# Patient Record
Sex: FEMALE | Race: WHITE | URBAN - METROPOLITAN AREA
[De-identification: names, ages, dates, MRNs, and addresses within clinical notes are randomized per-mention and may not be internally consistent; named-entity substitution may affect disease eponyms.]

---

## 2017-12-05 RX ORDER — LORATADINE 10 MG/1
10 TABLET, ORALLY DISINTEGRATING ORAL DAILY
COMMUNITY

## 2017-12-05 RX ORDER — RALOXIFENE HYDROCHLORIDE 60 MG/1
60 TABLET, FILM COATED ORAL DAILY
COMMUNITY

## 2017-12-11 NOTE — PHARMACY-ADMISSION MEDICATION HISTORY
Admission medication history interview status for this patient is complete. See [facility] admission navigator for allergy information, prior to admission medications and immunization status.     PTA meds completed by pre-admitting nurse Ilda Marcos and reviewed by pharmacy       Prior to Admission medications    Medication Sig Last Dose Taking? Auth Provider   Acetaminophen (TYLENOL EX ST ARTHRITIS PAIN PO) Take 2 tablets by mouth daily  Yes Reported, Patient   diphenhydrAMINE-acetaminophen (TYLENOL PM)  MG tablet Take 1 tablet by mouth nightly as needed for sleep  Yes Reported, Patient   loratadine (CLARITIN REDITABS) 10 MG ODT tab Take 10 mg by mouth daily  Yes Reported, Patient   METOPROLOL SUCCINATE ER PO Take 25 mg by mouth daily  Yes Reported, Patient   psyllium 0.52 G capsule Take 1 capsule by mouth daily  Yes Reported, Patient   DULOXETINE HCL PO Take 30 mg by mouth daily  Yes Reported, Patient   raloxifene (EVISTA) 60 MG tablet Take 60 mg by mouth daily  Yes Reported, Patient   ASPIRIN EC PO Take 81 mg by mouth daily  Yes Reported, Patient   LOSARTAN POTASSIUM PO Take 25 mg by mouth 2 times daily  Yes Reported, Patient   LEVOTHYROXINE SODIUM PO Take 75 mcg by mouth daily   Yes Reported, Patient   OMEPRAZOLE PO Take 40 mg by mouth every morning   Yes Reported, Patient   Ferrous Sulfate (IRON SUPPLEMENT PO) Take 65 mg by mouth 2 times daily (with meals)   Yes Reported, Patient   calcium carbonate (OS-JEIMY 500 MG [place]. [place]) 500 MG tablet Take 500 mg by mouth 3 times daily   Yes Reported, Patient   Alum Hydroxide-Mag Carbonate (GAVISCON PO) Take 2 tablets by mouth 4 times daily   Yes Reported, Patient   Calcium-Vitamin D-Vitamin K 500-100-40 MG-UNT-MCG CHEW Take 3 tablets by mouth daily  Yes Reported, Patient

## 2017-12-13 ENCOUNTER — ANESTHESIA (OUTPATIENT)
Dept: SURGERY | Facility: CLINIC | Age: 82
DRG: 454 | End: 2017-12-13
Payer: MEDICARE

## 2017-12-13 ENCOUNTER — HOSPITAL ENCOUNTER (INPATIENT)
Facility: CLINIC | Age: 82
LOS: 2 days | Discharge: HOME | DRG: 454 | End: 2017-12-15
Attending: NEUROLOGICAL SURGERY | Admitting: NEUROLOGICAL SURGERY
Payer: MEDICARE

## 2017-12-13 ENCOUNTER — ANESTHESIA EVENT (OUTPATIENT)
Dept: SURGERY | Facility: CLINIC | Age: 82
DRG: 454 | End: 2017-12-13
Payer: MEDICARE

## 2017-12-13 ENCOUNTER — APPOINTMENT (OUTPATIENT)
Dept: GENERAL RADIOLOGY | Facility: CLINIC | Age: 82
DRG: 454 | End: 2017-12-13
Attending: NEUROLOGICAL SURGERY
Payer: MEDICARE

## 2017-12-13 DIAGNOSIS — M43.16 SPONDYLOLISTHESIS OF LUMBAR REGION: Primary | ICD-10-CM

## 2017-12-13 PROBLEM — M43.10 SPONDYLOLISTHESIS: Status: ACTIVE | Noted: 2017-12-13

## 2017-12-13 LAB
ABO + RH BLD: NORMAL
ABO + RH BLD: NORMAL
BLD GP AB SCN SERPL QL: NORMAL
BLOOD BANK CMNT PATIENT-IMP: NORMAL
CREAT SERPL-MCNC: 0.81 MG/DL (ref 0.52–1.04)
GFR SERPL CREATININE-BSD FRML MDRD: 67 ML/MIN/1.7M2
POTASSIUM SERPL-SCNC: 3.6 MMOL/L (ref 3.4–5.3)
SPECIMEN EXP DATE BLD: NORMAL

## 2017-12-13 PROCEDURE — 37000009 ZZH ANESTHESIA TECHNICAL FEE, EACH ADDTL 15 MIN: Performed by: NEUROLOGICAL SURGERY

## 2017-12-13 PROCEDURE — 3E0R3BZ INTRODUCTION OF ANESTHETIC AGENT INTO SPINAL CANAL, PERCUTANEOUS APPROACH: ICD-10-PCS | Performed by: NEUROLOGICAL SURGERY

## 2017-12-13 PROCEDURE — 25000125 ZZHC RX 250: Performed by: NEUROLOGICAL SURGERY

## 2017-12-13 PROCEDURE — 82565 ASSAY OF CREATININE: CPT | Performed by: ANESTHESIOLOGY

## 2017-12-13 PROCEDURE — C1713 ANCHOR/SCREW BN/BN,TIS/BN: HCPCS | Performed by: NEUROLOGICAL SURGERY

## 2017-12-13 PROCEDURE — 71000012 ZZH RECOVERY PHASE 1 LEVEL 1 FIRST HR: Performed by: NEUROLOGICAL SURGERY

## 2017-12-13 PROCEDURE — 0SG00A0 FUSION OF LUMBAR VERTEBRAL JOINT WITH INTERBODY FUSION DEVICE, ANTERIOR APPROACH, ANTERIOR COLUMN, OPEN APPROACH: ICD-10-PCS | Performed by: NEUROLOGICAL SURGERY

## 2017-12-13 PROCEDURE — 25000128 H RX IP 250 OP 636: Performed by: ANESTHESIOLOGY

## 2017-12-13 PROCEDURE — 36000069 ZZH SURGERY LEVEL 5 EA 15 ADDTL MIN: Performed by: NEUROLOGICAL SURGERY

## 2017-12-13 PROCEDURE — 86850 RBC ANTIBODY SCREEN: CPT | Performed by: ANESTHESIOLOGY

## 2017-12-13 PROCEDURE — 25000128 H RX IP 250 OP 636: Performed by: NURSE ANESTHETIST, CERTIFIED REGISTERED

## 2017-12-13 PROCEDURE — 71000013 ZZH RECOVERY PHASE 1 LEVEL 1 EA ADDTL HR: Performed by: NEUROLOGICAL SURGERY

## 2017-12-13 PROCEDURE — 12000007 ZZH R&B INTERMEDIATE

## 2017-12-13 PROCEDURE — 84132 ASSAY OF SERUM POTASSIUM: CPT | Performed by: ANESTHESIOLOGY

## 2017-12-13 PROCEDURE — 0SG03J1 FUSION OF LUMBAR VERTEBRAL JOINT WITH SYNTHETIC SUBSTITUTE, POSTERIOR APPROACH, POSTERIOR COLUMN, PERCUTANEOUS APPROACH: ICD-10-PCS | Performed by: NEUROLOGICAL SURGERY

## 2017-12-13 PROCEDURE — 25000128 H RX IP 250 OP 636: Performed by: NEUROLOGICAL SURGERY

## 2017-12-13 PROCEDURE — 40000306 ZZH STATISTIC PRE PROC ASSESS II: Performed by: NEUROLOGICAL SURGERY

## 2017-12-13 PROCEDURE — 25000566 ZZH SEVOFLURANE, EA 15 MIN: Performed by: NEUROLOGICAL SURGERY

## 2017-12-13 PROCEDURE — 3E0R33Z INTRODUCTION OF ANTI-INFLAMMATORY INTO SPINAL CANAL, PERCUTANEOUS APPROACH: ICD-10-PCS | Performed by: NEUROLOGICAL SURGERY

## 2017-12-13 PROCEDURE — 25000125 ZZHC RX 250: Performed by: NURSE ANESTHETIST, CERTIFIED REGISTERED

## 2017-12-13 PROCEDURE — 86901 BLOOD TYPING SEROLOGIC RH(D): CPT | Performed by: ANESTHESIOLOGY

## 2017-12-13 PROCEDURE — 27210794 ZZH OR GENERAL SUPPLY STERILE: Performed by: NEUROLOGICAL SURGERY

## 2017-12-13 PROCEDURE — 25000132 ZZH RX MED GY IP 250 OP 250 PS 637: Mod: GY | Performed by: NEUROLOGICAL SURGERY

## 2017-12-13 PROCEDURE — 0SB20ZZ EXCISION OF LUMBAR VERTEBRAL DISC, OPEN APPROACH: ICD-10-PCS | Performed by: NEUROLOGICAL SURGERY

## 2017-12-13 PROCEDURE — 27211024 ZZHC OR SUPPLY OTHER OPNP: Performed by: NEUROLOGICAL SURGERY

## 2017-12-13 PROCEDURE — 86900 BLOOD TYPING SEROLOGIC ABO: CPT | Performed by: ANESTHESIOLOGY

## 2017-12-13 PROCEDURE — 40000277 XR SURGERY CARM FLUORO LESS THAN 5 MIN W STILLS: Mod: TC

## 2017-12-13 PROCEDURE — 36000071 ZZH SURGERY LEVEL 5 W FLUORO 1ST 30 MIN: Performed by: NEUROLOGICAL SURGERY

## 2017-12-13 PROCEDURE — 37000008 ZZH ANESTHESIA TECHNICAL FEE, 1ST 30 MIN: Performed by: NEUROLOGICAL SURGERY

## 2017-12-13 PROCEDURE — 36415 COLL VENOUS BLD VENIPUNCTURE: CPT | Performed by: ANESTHESIOLOGY

## 2017-12-13 PROCEDURE — A9270 NON-COVERED ITEM OR SERVICE: HCPCS | Mod: GY | Performed by: NEUROLOGICAL SURGERY

## 2017-12-13 DEVICE — IMPLANTABLE DEVICE: Type: IMPLANTABLE DEVICE | Site: SPINE LUMBAR | Status: FUNCTIONAL

## 2017-12-13 RX ORDER — PROPOFOL 10 MG/ML
INJECTION, EMULSION INTRAVENOUS PRN
Status: DISCONTINUED | OUTPATIENT
Start: 2017-12-13 | End: 2017-12-13

## 2017-12-13 RX ORDER — SODIUM CHLORIDE AND POTASSIUM CHLORIDE 150; 450 MG/100ML; MG/100ML
INJECTION, SOLUTION INTRAVENOUS CONTINUOUS
Status: DISCONTINUED | OUTPATIENT
Start: 2017-12-13 | End: 2017-12-15 | Stop reason: HOSPADM

## 2017-12-13 RX ORDER — LIDOCAINE 40 MG/G
CREAM TOPICAL
Status: DISCONTINUED | OUTPATIENT
Start: 2017-12-13 | End: 2017-12-15 | Stop reason: HOSPADM

## 2017-12-13 RX ORDER — OXYCODONE HYDROCHLORIDE 5 MG/1
5-10 TABLET ORAL EVERY 4 HOURS PRN
Status: DISCONTINUED | OUTPATIENT
Start: 2017-12-13 | End: 2017-12-14

## 2017-12-13 RX ORDER — RALOXIFENE HYDROCHLORIDE 60 MG/1
60 TABLET, FILM COATED ORAL DAILY
Status: DISCONTINUED | OUTPATIENT
Start: 2017-12-13 | End: 2017-12-15 | Stop reason: HOSPADM

## 2017-12-13 RX ORDER — DEXAMETHASONE SODIUM PHOSPHATE 4 MG/ML
INJECTION, SOLUTION INTRA-ARTICULAR; INTRALESIONAL; INTRAMUSCULAR; INTRAVENOUS; SOFT TISSUE PRN
Status: DISCONTINUED | OUTPATIENT
Start: 2017-12-13 | End: 2017-12-13

## 2017-12-13 RX ORDER — LOSARTAN POTASSIUM 25 MG/1
25 TABLET ORAL 2 TIMES DAILY
Status: DISCONTINUED | OUTPATIENT
Start: 2017-12-13 | End: 2017-12-15 | Stop reason: HOSPADM

## 2017-12-13 RX ORDER — ACETAMINOPHEN 10 MG/ML
1000 INJECTION, SOLUTION INTRAVENOUS ONCE
Status: COMPLETED | OUTPATIENT
Start: 2017-12-13 | End: 2017-12-13

## 2017-12-13 RX ORDER — NALOXONE HYDROCHLORIDE 0.4 MG/ML
.1-.4 INJECTION, SOLUTION INTRAMUSCULAR; INTRAVENOUS; SUBCUTANEOUS
Status: DISCONTINUED | OUTPATIENT
Start: 2017-12-13 | End: 2017-12-15 | Stop reason: HOSPADM

## 2017-12-13 RX ORDER — CEFAZOLIN SODIUM 1 G/3ML
1 INJECTION, POWDER, FOR SOLUTION INTRAMUSCULAR; INTRAVENOUS SEE ADMIN INSTRUCTIONS
Status: DISCONTINUED | OUTPATIENT
Start: 2017-12-13 | End: 2017-12-13 | Stop reason: HOSPADM

## 2017-12-13 RX ORDER — ACETAMINOPHEN 325 MG/1
975 TABLET ORAL EVERY 8 HOURS
Status: DISCONTINUED | OUTPATIENT
Start: 2017-12-14 | End: 2017-12-15 | Stop reason: HOSPADM

## 2017-12-13 RX ORDER — CEFAZOLIN SODIUM 2 G/100ML
2 INJECTION, SOLUTION INTRAVENOUS
Status: COMPLETED | OUTPATIENT
Start: 2017-12-13 | End: 2017-12-13

## 2017-12-13 RX ORDER — ONDANSETRON 4 MG/1
4 TABLET, ORALLY DISINTEGRATING ORAL EVERY 30 MIN PRN
Status: DISCONTINUED | OUTPATIENT
Start: 2017-12-13 | End: 2017-12-13 | Stop reason: HOSPADM

## 2017-12-13 RX ORDER — METOPROLOL SUCCINATE 25 MG/1
25 TABLET, EXTENDED RELEASE ORAL DAILY
Status: DISCONTINUED | OUTPATIENT
Start: 2017-12-14 | End: 2017-12-15 | Stop reason: HOSPADM

## 2017-12-13 RX ORDER — NALOXONE HYDROCHLORIDE 0.4 MG/ML
.1-.4 INJECTION, SOLUTION INTRAMUSCULAR; INTRAVENOUS; SUBCUTANEOUS
Status: ACTIVE | OUTPATIENT
Start: 2017-12-13 | End: 2017-12-14

## 2017-12-13 RX ORDER — FENTANYL CITRATE 50 UG/ML
INJECTION, SOLUTION INTRAMUSCULAR; INTRAVENOUS PRN
Status: DISCONTINUED | OUTPATIENT
Start: 2017-12-13 | End: 2017-12-13

## 2017-12-13 RX ORDER — LABETALOL HYDROCHLORIDE 5 MG/ML
INJECTION, SOLUTION INTRAVENOUS PRN
Status: DISCONTINUED | OUTPATIENT
Start: 2017-12-13 | End: 2017-12-13

## 2017-12-13 RX ORDER — DULOXETIN HYDROCHLORIDE 30 MG/1
30 CAPSULE, DELAYED RELEASE ORAL EVERY EVENING
Status: DISCONTINUED | OUTPATIENT
Start: 2017-12-13 | End: 2017-12-15 | Stop reason: HOSPADM

## 2017-12-13 RX ORDER — BUPIVACAINE HYDROCHLORIDE 7.5 MG/ML
INJECTION, SOLUTION EPIDURAL; RETROBULBAR PRN
Status: DISCONTINUED | OUTPATIENT
Start: 2017-12-13 | End: 2017-12-13 | Stop reason: HOSPADM

## 2017-12-13 RX ORDER — KETOROLAC TROMETHAMINE 15 MG/ML
15 INJECTION, SOLUTION INTRAMUSCULAR; INTRAVENOUS EVERY 6 HOURS
Status: COMPLETED | OUTPATIENT
Start: 2017-12-13 | End: 2017-12-14

## 2017-12-13 RX ORDER — LIDOCAINE 40 MG/G
CREAM TOPICAL
Status: DISCONTINUED | OUTPATIENT
Start: 2017-12-13 | End: 2017-12-13 | Stop reason: HOSPADM

## 2017-12-13 RX ORDER — LIDOCAINE HYDROCHLORIDE 10 MG/ML
INJECTION, SOLUTION INFILTRATION; PERINEURAL PRN
Status: DISCONTINUED | OUTPATIENT
Start: 2017-12-13 | End: 2017-12-13

## 2017-12-13 RX ORDER — ACETAMINOPHEN 325 MG/1
650 TABLET ORAL EVERY 4 HOURS PRN
Status: DISCONTINUED | OUTPATIENT
Start: 2017-12-16 | End: 2017-12-15 | Stop reason: HOSPADM

## 2017-12-13 RX ORDER — LABETALOL HYDROCHLORIDE 5 MG/ML
10 INJECTION, SOLUTION INTRAVENOUS
Status: DISCONTINUED | OUTPATIENT
Start: 2017-12-13 | End: 2017-12-13 | Stop reason: HOSPADM

## 2017-12-13 RX ORDER — FENTANYL CITRATE 50 UG/ML
25-50 INJECTION, SOLUTION INTRAMUSCULAR; INTRAVENOUS
Status: DISCONTINUED | OUTPATIENT
Start: 2017-12-13 | End: 2017-12-13 | Stop reason: HOSPADM

## 2017-12-13 RX ORDER — ONDANSETRON 2 MG/ML
4 INJECTION INTRAMUSCULAR; INTRAVENOUS EVERY 30 MIN PRN
Status: DISCONTINUED | OUTPATIENT
Start: 2017-12-13 | End: 2017-12-13 | Stop reason: HOSPADM

## 2017-12-13 RX ORDER — GLYCOPYRROLATE 0.2 MG/ML
INJECTION, SOLUTION INTRAMUSCULAR; INTRAVENOUS PRN
Status: DISCONTINUED | OUTPATIENT
Start: 2017-12-13 | End: 2017-12-13

## 2017-12-13 RX ORDER — SODIUM CHLORIDE, SODIUM LACTATE, POTASSIUM CHLORIDE, CALCIUM CHLORIDE 600; 310; 30; 20 MG/100ML; MG/100ML; MG/100ML; MG/100ML
INJECTION, SOLUTION INTRAVENOUS CONTINUOUS
Status: DISCONTINUED | OUTPATIENT
Start: 2017-12-13 | End: 2017-12-13 | Stop reason: HOSPADM

## 2017-12-13 RX ORDER — HYDROMORPHONE HYDROCHLORIDE 1 MG/ML
.3-.5 INJECTION, SOLUTION INTRAMUSCULAR; INTRAVENOUS; SUBCUTANEOUS EVERY 5 MIN PRN
Status: DISCONTINUED | OUTPATIENT
Start: 2017-12-13 | End: 2017-12-13 | Stop reason: HOSPADM

## 2017-12-13 RX ORDER — ONDANSETRON 2 MG/ML
INJECTION INTRAMUSCULAR; INTRAVENOUS PRN
Status: DISCONTINUED | OUTPATIENT
Start: 2017-12-13 | End: 2017-12-13

## 2017-12-13 RX ORDER — LEVOTHYROXINE SODIUM 75 UG/1
75 TABLET ORAL DAILY
Status: DISCONTINUED | OUTPATIENT
Start: 2017-12-13 | End: 2017-12-15 | Stop reason: HOSPADM

## 2017-12-13 RX ORDER — HYDRALAZINE HYDROCHLORIDE 20 MG/ML
2.5-5 INJECTION INTRAMUSCULAR; INTRAVENOUS EVERY 10 MIN PRN
Status: DISCONTINUED | OUTPATIENT
Start: 2017-12-13 | End: 2017-12-13 | Stop reason: HOSPADM

## 2017-12-13 RX ORDER — CEFAZOLIN SODIUM 2 G/100ML
2 INJECTION, SOLUTION INTRAVENOUS
Status: DISCONTINUED | OUTPATIENT
Start: 2017-12-13 | End: 2017-12-13

## 2017-12-13 RX ADMIN — LABETALOL HYDROCHLORIDE 10 MG: 5 INJECTION, SOLUTION INTRAVENOUS at 14:12

## 2017-12-13 RX ADMIN — DEXMEDETOMIDINE HYDROCHLORIDE 0.7 MCG/KG/HR: 100 INJECTION, SOLUTION INTRAVENOUS at 13:51

## 2017-12-13 RX ADMIN — KETOROLAC TROMETHAMINE 15 MG: 15 INJECTION, SOLUTION INTRAMUSCULAR; INTRAVENOUS at 17:54

## 2017-12-13 RX ADMIN — FENTANYL CITRATE 50 MCG: 50 INJECTION INTRAMUSCULAR; INTRAVENOUS at 15:17

## 2017-12-13 RX ADMIN — Medication 0.2 MG: at 16:16

## 2017-12-13 RX ADMIN — SODIUM CHLORIDE, POTASSIUM CHLORIDE, SODIUM LACTATE AND CALCIUM CHLORIDE: 600; 310; 30; 20 INJECTION, SOLUTION INTRAVENOUS at 15:13

## 2017-12-13 RX ADMIN — SODIUM CHLORIDE, POTASSIUM CHLORIDE, SODIUM LACTATE AND CALCIUM CHLORIDE: 600; 310; 30; 20 INJECTION, SOLUTION INTRAVENOUS at 14:09

## 2017-12-13 RX ADMIN — ONDANSETRON 4 MG: 2 INJECTION INTRAMUSCULAR; INTRAVENOUS at 14:47

## 2017-12-13 RX ADMIN — LIDOCAINE HYDROCHLORIDE 30 MG: 10 INJECTION, SOLUTION INFILTRATION; PERINEURAL at 13:31

## 2017-12-13 RX ADMIN — SODIUM CHLORIDE AND POTASSIUM CHLORIDE: 4.5; 1.49 INJECTION, SOLUTION INTRAVENOUS at 21:50

## 2017-12-13 RX ADMIN — FENTANYL CITRATE 25 MCG: 50 INJECTION INTRAMUSCULAR; INTRAVENOUS at 15:49

## 2017-12-13 RX ADMIN — GLYCOPYRROLATE 0.2 MG: 0.2 INJECTION, SOLUTION INTRAMUSCULAR; INTRAVENOUS at 13:31

## 2017-12-13 RX ADMIN — RALOXIFENE HYDROCHLORIDE 60 MG: 60 TABLET, FILM COATED ORAL at 21:51

## 2017-12-13 RX ADMIN — LEVOTHYROXINE SODIUM 75 MCG: 75 TABLET ORAL at 21:51

## 2017-12-13 RX ADMIN — FENTANYL CITRATE 25 MCG: 50 INJECTION INTRAMUSCULAR; INTRAVENOUS at 15:10

## 2017-12-13 RX ADMIN — OXYCODONE HYDROCHLORIDE 5 MG: 5 TABLET ORAL at 22:51

## 2017-12-13 RX ADMIN — ROCURONIUM BROMIDE 10 MG: 10 INJECTION INTRAVENOUS at 13:31

## 2017-12-13 RX ADMIN — LOSARTAN POTASSIUM 25 MG: 25 TABLET, FILM COATED ORAL at 21:51

## 2017-12-13 RX ADMIN — SODIUM CHLORIDE, POTASSIUM CHLORIDE, SODIUM LACTATE AND CALCIUM CHLORIDE: 600; 310; 30; 20 INJECTION, SOLUTION INTRAVENOUS at 13:25

## 2017-12-13 RX ADMIN — DULOXETINE HYDROCHLORIDE 30 MG: 30 CAPSULE, DELAYED RELEASE ORAL at 21:51

## 2017-12-13 RX ADMIN — Medication 70 MG: at 13:31

## 2017-12-13 RX ADMIN — DEXAMETHASONE SODIUM PHOSPHATE 6 MG: 4 INJECTION, SOLUTION INTRA-ARTICULAR; INTRALESIONAL; INTRAMUSCULAR; INTRAVENOUS; SOFT TISSUE at 13:31

## 2017-12-13 RX ADMIN — FENTANYL CITRATE 50 MCG: 50 INJECTION, SOLUTION INTRAMUSCULAR; INTRAVENOUS at 13:30

## 2017-12-13 RX ADMIN — Medication 0.3 MG: at 16:04

## 2017-12-13 RX ADMIN — PROPOFOL 120 MG: 10 INJECTION, EMULSION INTRAVENOUS at 13:31

## 2017-12-13 RX ADMIN — LABETALOL HYDROCHLORIDE 10 MG: 5 INJECTION, SOLUTION INTRAVENOUS at 14:22

## 2017-12-13 RX ADMIN — PROPOFOL 50 MG: 10 INJECTION, EMULSION INTRAVENOUS at 14:01

## 2017-12-13 RX ADMIN — PROPOFOL 50 MG: 10 INJECTION, EMULSION INTRAVENOUS at 13:48

## 2017-12-13 RX ADMIN — CEFAZOLIN SODIUM 2 G: 2 INJECTION, SOLUTION INTRAVENOUS at 13:25

## 2017-12-13 RX ADMIN — ACETAMINOPHEN 1000 MG: 10 INJECTION, SOLUTION INTRAVENOUS at 16:33

## 2017-12-13 RX ADMIN — CEFAZOLIN SODIUM 1 G: 1 INJECTION, SOLUTION INTRAVENOUS at 21:50

## 2017-12-13 NOTE — ANESTHESIA CARE TRANSFER NOTE
Patient: Kimmy Shields    Procedure(s):  L4-L5 Minimally Invasive Direct Lateral Interbody Fusion  - Wound Class: I-Clean    Diagnosis: Spondylolithesis  Diagnosis Additional Information: No value filed.    Anesthesia Type:   General, ETT     Note:  Airway :Face Mask  Patient transferred to:PACU  Handoff Report: Identifed the Patient, Identified the Reponsible Provider, Reviewed the pertinent medical history, Discussed the surgical course, Reviewed Intra-OP anesthesia mangement and issues during anesthesia, Set expectations for post-procedure period and Allowed opportunity for questions and acknowledgement of understanding      Vitals: (Last set prior to Anesthesia Care Transfer)    CRNA VITALS  12/13/2017 1411 - 12/13/2017 1448      12/13/2017             Pulse: 73    SpO2: 100 %    Resp Rate (observed): (!)  5                Electronically Signed By: EDWIN Kimball CRNA  December 13, 2017  2:48 PM

## 2017-12-13 NOTE — ANESTHESIA POSTPROCEDURE EVALUATION
Patient: Kimmy Shields    Procedure(s):  L4-L5 Minimally Invasive Direct Lateral Interbody Fusion  - Wound Class: I-Clean    Diagnosis:Spondylolithesis  Diagnosis Additional Information: 1) L4/5 spondylolistehsis  degenerative disc disease.  2) BMI 32  Obesity     Anesthesia Type:  General, ETT    Note:  Anesthesia Post Evaluation    Patient location during evaluation: PACU  Patient participation: Able to fully participate in evaluation  Level of consciousness: awake  Pain management: adequate  Airway patency: patent  Cardiovascular status: acceptable  Respiratory status: acceptable  Hydration status: acceptable  PONV: controlled     Anesthetic complications: None          Last vitals:  Vitals:    12/13/17 1445 12/13/17 1450 12/13/17 1500   BP: 154/64 150/61 161/69   Resp: 16 10 13   Temp:      SpO2: 100% 100% 99%         Electronically Signed By: Jorge A Mcfarlane DO  December 13, 2017  3:13 PM

## 2017-12-13 NOTE — IP AVS SNAPSHOT
MRN:[id number]                      After Visit Summary   12/13/2017    Kimmy Shields    MRN: [id number]           Thank you!     Thank you for choosing [facility] for your care. Our goal is always to provide you with excellent care. Hearing back from our patients is one way we can continue to improve our services. Please take a few minutes to complete the written survey that you may receive in the mail after you visit. If you would like to speak to someone directly about your visit please contact Patient Relations at [phone]. Thank you!          Patient Information     Date Of Birth          8/9/1932        Designated Caregiver       Most Recent Value    Caregiver    Will someone help with your care after discharge? yes    Name of designated caregiver Heidy Hightower (daughter)    Phone number of caregiver [phone]      About your hospital stay     You were admitted on:  December 13, 2017 You last received care in the:  [facility]    You were discharged on:  December 15, 2017        Reason for your hospital stay       Spine surgery                  Who to Call     For medical emergencies, please call 911.  For non-urgent questions about your medical care, please call your primary care provider or clinic, [phone]  For questions related to your surgery, please call your surgery clinic        Attending Provider     Provider Specialty    Sisi Hardy MD Neurosurgery       Primary Care Provider Office Phone # Fax #    Soledad Hensley [phone]      After Care Instructions     Activity       Your activity upon discharge: Ad peewee within following limitations:  No excessive activities   No Bending, Twisting, climbing, Crawling,   No lifting more than 8 lb for 2 weeks, or 15 lb for 2 months or 25 lb for 4 months or 35 lb for 6 months  Brace for riding cars for 4-6 months            Diet       Follow this diet upon discharge: resume prior  "to admission diet            Discharge Instructions       Refer to [facility] spine handbood or online at [email or web address]  Or print it for patient at [email or web address]                  Follow-up Appointments     Follow-up and recommended labs and tests        Follow up in 2 weeks with me or PCP for wound check ( patient's choice) if patients want to go to PCP for wound check, then f/u in my office  With one month                  Further instructions from your care team       You just recently had back/neck surgery. Please, remember that surgery is invasive and thus that some pain is expected. Now that you are in recovery, there are a few important things to remember.     First, you should try to walk as much as possible during recovery. This mild stress stimulates our body's natural healing process and will speed your recovery. If you experience significantly increased pain while performing an act, STOP what you are doing, do not try to work through the pain. If you continue to work through the pain you may delay the healing process.     Second, it's crucial that you know your restrictions. Remember, this acronym \"BLT\". That is you should not Bend, Lift over 8 pounds (and over shoulder height if cervical patient), or Twist. After two weeks you may increase the weight limit up to 16 pounds, but you still may not bend or twist. These will then be reviewed further with you at your one month appointment. Please adhere to these and your recovery should progress normally.     Third, it is important to follow up after your surgery; specifically on two dates. You MUST see a health care provider (doesn't have to be us) to evaluate the incision 10-14 days after surgery. Why? We just want to make sure the surgical site is not infected and take out staples if used. Infection seems to peek around this time and it is very important to see a medical " professional at this time interval. The later appointment you MUST attend is with us ([facility] [place]), this will be your one month post-op visit.     Lastly, please remember for all medication refills, to have your pharmacy send a refill request or call our office at least 48hrs in advance before you run out of the medication.     We hope your recovery is speedy and that you gain the expected result from the surgery. Thank you for allowing us the opportunity to serve you. Call us at [phone] if you have questions or concerns.  Opioid Medication Information    You have been given a prescription for an opioid (narcotic) pain medicine and/or have received a pain medicine. These medicines can make you drowsy or impaired. You must not drive, operate dangerous equipment, or engage in any other dangerous activities while taking these medications. If you drive while taking these medications, you could be arrested for DUI, or driving under the influence. Do not drink any alcohol while you are taking these medications.   Opioid pain medications can cause addiction. If you have a history of chemical dependency of any type, you are at a higher risk of becoming addicted to pain medications.  Only take these prescribed medications to treat your pain when all other options have been tried. Take it for as short a time and as few doses as possible. Store your pain pills in a secure place, as they are frequently stolen and provide a dangerous opportunity for children or visitors in your house to start abusing these powerful medications. We will not replace any lost or stolen medicine.  As soon as your pain is better, you should seek out a drug take back program (see your local police department) to dispose of them.   Over-the-counter medications and prescription drugs can pollute aldrich and be harmful to humans, fish, and other wildlife when disposed of improperly -- do not flush medications down the toilet or  place in the trash.  Properly disposing of medicines is important to prevent abuse or poisoning and protect the environment.     Prescription and over-the-counter medications are collected anonymously from residents for free at [facility] drop-off locations. Visit the [facility]'s Office Prescription Drug Drop-Off page for the list of drop-off sites and information about the program.       [place]  Police Department [phone] Mon-Fri  8am to 4:30pm     [place]  Police Department [phone] 24hrs a day    [place]  Police Department [phone] Mon-Fri  8am to 6:00pm     [place]  Police Department [phone] Mon-Fri  8am to 4:30pm     [place]  Police Department [phone] 24hrs a day      [place]  Police Department [phone] Mon-Fri  8am to 4:30pm   Many prescription pain medications contain Tylenol  (acetaminophen), including Vicodin , Tylenol #3 , Norco , Lortab , and Percocet .  You should not take any extra pills of Tylenol  if you are using these prescription medications or you can get very sick.  Do not ever take more than 3000 mg of acetaminophen in any 24 hour period.  All opioids tend to cause constipation. Drink plenty of water and eat foods that have a lot of fiber, such as fruits, vegetables, prune juice, apple juice and high fiber cereal.  Take a laxative if you don t move your bowels at least every other day. Miralax , Milk of Magnesia, Colace , or Senna  can be used to keep you regular.  You will likely need to continue stool softeners and stimulants while taking opioids.       Pending Results     No orders found from 12/11/2017 to 12/14/2017.            Statement of Approval     Ordered          12/14/17 1001  I have reviewed and agree with all the recommendations and orders detailed in this document.  EFFECTIVE NOW     Approved and electronically signed by:  Sisi Hardy MD             Admission Information     Date & Time Provider  "Department Dept. Phone    2017 Sisi Hardy MD [facility] [phone]      Your Vitals Were     Blood Pressure Temperature Respirations Height Weight Pulse Oximetry    165/70 98.3  F (36.8  C) 16 1.499 m (4' 11\") 73.5 kg (162 lb 1.6 oz) 93%    BMI (Body Mass Index)                   32.74 kg/m2           [employer] Information     [employer] lets you send messages to your doctor, view your test results, renew your prescriptions, schedule appointments and more. To sign up, go to [email or web address] . Click on \"Log in\" on the left side of the screen, which will take you to the Welcome page. Then click on \"Sign up Now\" on the right side of the page.     You will be asked to enter the access code listed below, as well as some personal information. Please follow the directions to create your username and password.     Your access code is: [email or web address]  Expires: 3/15/2018 10:15 AM     Your access code will  in 90 days. If you need help or a new code, please call your [facility] clinic or [phone].        Care EveryWhere ID     This is your Care EveryWhere ID. This could be used by other organizations to access your [facility] medical records  [id number]        Equal Access to Services     HENRI DELANEY AH: Hadannalee Stauffer, waaxda luqadaha, qaybta kaalmada deisy, patricia ceh. So [facility] [phone].    ATENCIÓN: Si habla español, tiene a whitley disposición servicios gratuitos de asistencia lingüística. Dominic al [phone].    We comply with applicable federal civil rights laws and [place] laws. We do not discriminate on the basis of race, color, national origin, age, disability, sex, sexual orientation, or gender identity.               Review of your medicines      START taking        Dose / Directions    cyclobenzaprine 5 MG tablet   Commonly known as:  FLEXERIL        Dose:  5 mg   Take 1 tablet (5 mg) by mouth 3 times daily as needed for muscle " spasms   Quantity:  42 tablet   Refills:  3       order for DME        Equipment being ordered: Walker Wheels () Treatment Diagnosis: spine surgery   Quantity:  1 each   Refills:  0       oxyCODONE IR 5 MG tablet   Commonly known as:  ROXICODONE        Dose:  5-10 mg   Take 1-2 tablets (5-10 mg) by mouth every 4 hours as needed for moderate to severe pain   Quantity:  18 tablet   Refills:  0         CONTINUE these medicines which have NOT CHANGED        Dose / Directions    ASPIRIN EC PO   Notes to Patient:  Home schedule        Dose:  81 mg   Take 81 mg by mouth daily   Refills:  0       calcium carbonate 1250 MG tablet   Commonly known as:  OS-JEIMY 500 mg [place]. [place]   Notes to Patient:  Home schedule        Dose:  500 mg   Take 500 mg by mouth 3 times daily   Refills:  0       Calcium-Vitamin D-Vitamin K 500-100-40 MG-UNT-MCG Chew   Notes to Patient:  Home schedule        Dose:  3 tablet   Take 3 tablets by mouth daily   Refills:  0       diphenhydrAMINE-acetaminophen  MG tablet   Commonly known as:  TYLENOL PM   Notes to Patient:  Home schedule        Dose:  1 tablet   Take 1 tablet by mouth nightly as needed for sleep   Refills:  0       DULOXETINE HCL PO        Dose:  30 mg   Take 30 mg by mouth daily   Refills:  0       GAVISCON PO   Notes to Patient:  Home schedule        Dose:  2 tablet   Take 2 tablets by mouth 4 times daily   Refills:  0       IRON SUPPLEMENT PO   Notes to Patient:  Home schedule        Dose:  65 mg   Take 65 mg by mouth 2 times daily (with meals)   Refills:  0       LEVOTHYROXINE SODIUM PO        Dose:  75 mcg   Take 75 mcg by mouth daily   Refills:  0       loratadine 10 MG ODT tab   Commonly known as:  CLARITIN REDITABS   Notes to Patient:  Home schedule        Dose:  10 mg   Take 10 mg by mouth daily   Refills:  0       LOSARTAN POTASSIUM PO        Dose:  25 mg   Take 25 mg by mouth 2 times daily   Refills:  0       METOPROLOL SUCCINATE ER PO        Dose:  25 mg   Take 25 mg by  mouth daily   Refills:  0       OMEPRAZOLE PO        Dose:  40 mg   Take 40 mg by mouth every morning   Refills:  0       psyllium 0.52 G capsule        Dose:  1 capsule   Take 1 capsule by mouth daily   Refills:  0       raloxifene 60 MG tablet   Commonly known as:  Evista        Dose:  60 mg   Take 60 mg by mouth daily   Refills:  0       TYLENOL EX ST ARTHRITIS PAIN PO   Notes to Patient:  Home schedule        Dose:  2 tablet   Take 2 tablets by mouth daily   Refills:  0            Where to get your medicines      These medications were sent to [place], [place] - [address] [facility]  [address] [facility] [address]     Phone:  [phone]     cyclobenzaprine 5 MG tablet         Some of these will need a paper prescription and others can be bought over the counter. Ask your nurse if you have questions.     Bring a paper prescription for each of these medications     order for DME    oxyCODONE IR 5 MG tablet                Protect others around you: Learn how to safely use, store and throw away your medicines at www.disposemymeds.org.             Medication List: This is a list of all your medications and when to take them. Check marks below indicate your daily home schedule. Keep this list as a reference.      Medications           Morning Afternoon Evening Bedtime As Needed    ASPIRIN EC PO   Take 81 mg by mouth daily   Notes to Patient:  Home schedule                                   calcium carbonate 1250 MG tablet   Commonly known as:  OS-JEIMY 500 mg [place]. [place]   Take 500 mg by mouth 3 times daily   Notes to Patient:  Home schedule                                Calcium-Vitamin D-Vitamin K 500-100-40 MG-UNT-MCG Chew   Take 3 tablets by mouth daily   Notes to Patient:  Home schedule                                cyclobenzaprine 5 MG tablet   Commonly known as:  FLEXERIL   Take 1 tablet (5 mg) by mouth 3 times daily as needed for muscle spasms                                    diphenhydrAMINE-acetaminophen  MG tablet   Commonly known as:  TYLENOL PM   Take 1 tablet by mouth nightly as needed for sleep   Notes to Patient:  Home schedule                                DULOXETINE HCL PO   Take 30 mg by mouth daily   Last time this was given:  30 mg on 12/14/2017  8:01 PM   Next Dose Due:  tonight                                   GAVISCON PO   Take 2 tablets by mouth 4 times daily   Notes to Patient:  Home schedule                                IRON SUPPLEMENT PO   Take 65 mg by mouth 2 times daily (with meals)   Notes to Patient:  Home schedule                                LEVOTHYROXINE SODIUM PO   Take 75 mcg by mouth daily   Last time this was given:  75 mcg on 12/15/2017  9:20 AM   Next Dose Due:  saturday                                   loratadine 10 MG ODT tab   Commonly known as:  CLARITIN REDITABS   Take 10 mg by mouth daily   Notes to Patient:  Home schedule                                LOSARTAN POTASSIUM PO   Take 25 mg by mouth 2 times daily   Last time this was given:  25 mg on 12/15/2017  9:21 AM   Next Dose Due:  This evening                                      METOPROLOL SUCCINATE ER PO   Take 25 mg by mouth daily   Last time this was given:  25 mg on 12/15/2017  9:21 AM                                OMEPRAZOLE PO   Take 40 mg by mouth every morning   Last time this was given:  40 mg on 12/15/2017  9:21 AM   Next Dose Due:  saturday                                   order for DME   Equipment being ordered: [employer] () Treatment Diagnosis: spine surgery                                oxyCODONE IR 5 MG tablet   Commonly known as:  ROXICODONE   Take 1-2 tablets (5-10 mg) by mouth every 4 hours as needed for moderate to severe pain   Last time this was given:  2.5 mg on 12/14/2017  6:38 PM                                   psyllium 0.52 G capsule   Take 1 capsule by mouth daily   Last time this was given:  1.56 g on 12/15/2017  9:21 AM   Next Dose  Due:  saturday                                   raloxifene 60 MG tablet   Commonly known as:  Evista   Take 60 mg by mouth daily   Last time this was given:  60 mg on 12/15/2017  9:21 AM   Next Dose Due:  saturday                                   TYLENOL EX ST ARTHRITIS PAIN PO   Take 2 tablets by mouth daily   Notes to Patient:  Home schedule

## 2017-12-13 NOTE — ANESTHESIA PREPROCEDURE EVALUATION
Anesthesia Evaluation     . Pt has had prior anesthetic.     No history of anesthetic complications          ROS/MED HX    ENT/Pulmonary:       Neurologic:     (+)neuropathy other neuro spinal stenosis    Cardiovascular:     (+) Dyslipidemia, hypertension----. : . . . :. .       METS/Exercise Tolerance:     Hematologic:         Musculoskeletal:         GI/Hepatic:     (+) GERD       Renal/Genitourinary:         Endo:     (+) thyroid problem hypothyroidism, .      Psychiatric:         Infectious Disease:         Malignancy:   (+) Malignancy History of Lymphoma/Leukemia          Other:                     Physical Exam  Normal systems: cardiovascular and pulmonary    Airway   Mallampati: II  TM distance: >3 FB  Neck ROM: full    Dental     Cardiovascular       Pulmonary                     Anesthesia Plan      History & Physical Review  History and physical reviewed and following examination; no interval change.    ASA Status:  3 .    NPO Status:  > 8 hours    Plan for General and ETT with Intravenous and Propofol induction. Maintenance will be Balanced.    PONV prophylaxis:  Ondansetron (or other 5HT-3) and Dexamethasone or Solumedrol       Postoperative Care  Postoperative pain management:  IV analgesics.      Consents  Anesthetic plan, risks, benefits and alternatives discussed with:  Patient.  Use of blood products discussed: Yes.   Use of blood products discussed with Patient.  Consented to blood products.  .                          .

## 2017-12-13 NOTE — PROGRESS NOTES
Dr. Yuan notified of patient's lethergy and pain level. Ofirmev ordered. Will continue to monitor.

## 2017-12-13 NOTE — IP AVS SNAPSHOT
[facility]    [address]    [facility] [address]    Phone:  [phone]    Fax:  [phone]                                       After Visit Summary   12/13/2017    Kimmy Shields    MRN: [id number]           After Visit Summary Signature Page     I have received my discharge instructions, and my questions have been answered. I have discussed any challenges I see with this plan with the nurse or doctor.    ..........................................................................................................................................  Patient/Patient Representative Signature      ..........................................................................................................................................  Patient Representative Print Name and Relationship to Patient    ..................................................               ................................................  Date                                            Time    ..........................................................................................................................................  Reviewed by Signature/Title    ...................................................              ..............................................  Date                                                            Time

## 2017-12-13 NOTE — PLAN OF CARE
Problem: Patient Care Overview  Goal: Plan of Care/Patient Progress Review  PT: Pt not up to floor at scheduled PT time. Will re-schedule for tomorrow AM and make recommendations at that time.

## 2017-12-14 ENCOUNTER — APPOINTMENT (OUTPATIENT)
Dept: PHYSICAL THERAPY | Facility: CLINIC | Age: 82
DRG: 454 | End: 2017-12-14
Attending: NEUROLOGICAL SURGERY
Payer: MEDICARE

## 2017-12-14 ENCOUNTER — APPOINTMENT (OUTPATIENT)
Dept: OCCUPATIONAL THERAPY | Facility: CLINIC | Age: 82
DRG: 454 | End: 2017-12-14
Attending: NEUROLOGICAL SURGERY
Payer: MEDICARE

## 2017-12-14 LAB
ANION GAP SERPL CALCULATED.3IONS-SCNC: 6 MMOL/L (ref 3–14)
BASOPHILS # BLD AUTO: 0 10E9/L (ref 0–0.2)
BASOPHILS NFR BLD AUTO: 0.1 %
BUN SERPL-MCNC: 11 MG/DL (ref 7–30)
CALCIUM SERPL-MCNC: 8.2 MG/DL (ref 8.5–10.1)
CHLORIDE SERPL-SCNC: 107 MMOL/L (ref 94–109)
CO2 SERPL-SCNC: 26 MMOL/L (ref 20–32)
CREAT SERPL-MCNC: 0.69 MG/DL (ref 0.52–1.04)
DIFFERENTIAL METHOD BLD: ABNORMAL
EOSINOPHIL # BLD AUTO: 0 10E9/L (ref 0–0.7)
EOSINOPHIL NFR BLD AUTO: 0 %
ERYTHROCYTE [DISTWIDTH] IN BLOOD BY AUTOMATED COUNT: 13.7 % (ref 10–15)
GFR SERPL CREATININE-BSD FRML MDRD: 81 ML/MIN/1.7M2
GLUCOSE SERPL-MCNC: 140 MG/DL (ref 70–99)
HCT VFR BLD AUTO: 31 % (ref 35–47)
HGB BLD-MCNC: 10.2 G/DL (ref 11.7–15.7)
IMM GRANULOCYTES # BLD: 0.1 10E9/L (ref 0–0.4)
IMM GRANULOCYTES NFR BLD: 0.4 %
LYMPHOCYTES # BLD AUTO: 1.1 10E9/L (ref 0.8–5.3)
LYMPHOCYTES NFR BLD AUTO: 8.2 %
MCH RBC QN AUTO: 33.8 PG (ref 26.5–33)
MCHC RBC AUTO-ENTMCNC: 32.9 G/DL (ref 31.5–36.5)
MCV RBC AUTO: 103 FL (ref 78–100)
MONOCYTES # BLD AUTO: 1.2 10E9/L (ref 0–1.3)
MONOCYTES NFR BLD AUTO: 8.5 %
NEUTROPHILS # BLD AUTO: 11.3 10E9/L (ref 1.6–8.3)
NEUTROPHILS NFR BLD AUTO: 82.8 %
NRBC # BLD AUTO: 0 10*3/UL
NRBC BLD AUTO-RTO: 0 /100
PLATELET # BLD AUTO: 251 10E9/L (ref 150–450)
POTASSIUM SERPL-SCNC: 4.5 MMOL/L (ref 3.4–5.3)
RBC # BLD AUTO: 3.02 10E12/L (ref 3.8–5.2)
SODIUM SERPL-SCNC: 139 MMOL/L (ref 133–144)
WBC # BLD AUTO: 13.6 10E9/L (ref 4–11)

## 2017-12-14 PROCEDURE — 40000193 ZZH STATISTIC PT WARD VISIT: Performed by: PHYSICAL THERAPIST

## 2017-12-14 PROCEDURE — 97530 THERAPEUTIC ACTIVITIES: CPT | Mod: GP | Performed by: PHYSICAL THERAPIST

## 2017-12-14 PROCEDURE — 99222 1ST HOSP IP/OBS MODERATE 55: CPT | Performed by: CLINICAL NURSE SPECIALIST

## 2017-12-14 PROCEDURE — 25000132 ZZH RX MED GY IP 250 OP 250 PS 637: Mod: GY | Performed by: CLINICAL NURSE SPECIALIST

## 2017-12-14 PROCEDURE — 25000132 ZZH RX MED GY IP 250 OP 250 PS 637: Mod: GY | Performed by: PHYSICIAN ASSISTANT

## 2017-12-14 PROCEDURE — A9270 NON-COVERED ITEM OR SERVICE: HCPCS | Mod: GY | Performed by: PHYSICIAN ASSISTANT

## 2017-12-14 PROCEDURE — 80048 BASIC METABOLIC PNL TOTAL CA: CPT | Performed by: NEUROLOGICAL SURGERY

## 2017-12-14 PROCEDURE — 99222 1ST HOSP IP/OBS MODERATE 55: CPT | Performed by: PHYSICIAN ASSISTANT

## 2017-12-14 PROCEDURE — 25000132 ZZH RX MED GY IP 250 OP 250 PS 637: Mod: GY | Performed by: NEUROLOGICAL SURGERY

## 2017-12-14 PROCEDURE — 40000133 ZZH STATISTIC OT WARD VISIT

## 2017-12-14 PROCEDURE — A9270 NON-COVERED ITEM OR SERVICE: HCPCS | Mod: GY | Performed by: CLINICAL NURSE SPECIALIST

## 2017-12-14 PROCEDURE — 99207 ZZC CONSULT E&M CHANGED TO INITIAL LEVEL: CPT | Performed by: PHYSICIAN ASSISTANT

## 2017-12-14 PROCEDURE — 25000128 H RX IP 250 OP 636: Performed by: NEUROLOGICAL SURGERY

## 2017-12-14 PROCEDURE — 12000007 ZZH R&B INTERMEDIATE

## 2017-12-14 PROCEDURE — 36415 COLL VENOUS BLD VENIPUNCTURE: CPT | Performed by: NEUROLOGICAL SURGERY

## 2017-12-14 PROCEDURE — 85025 COMPLETE CBC W/AUTO DIFF WBC: CPT | Performed by: NEUROLOGICAL SURGERY

## 2017-12-14 PROCEDURE — 97535 SELF CARE MNGMENT TRAINING: CPT | Mod: GO

## 2017-12-14 PROCEDURE — A9270 NON-COVERED ITEM OR SERVICE: HCPCS | Mod: GY | Performed by: NEUROLOGICAL SURGERY

## 2017-12-14 PROCEDURE — 97116 GAIT TRAINING THERAPY: CPT | Mod: GP | Performed by: PHYSICAL THERAPIST

## 2017-12-14 PROCEDURE — 97165 OT EVAL LOW COMPLEX 30 MIN: CPT | Mod: GO

## 2017-12-14 PROCEDURE — 97161 PT EVAL LOW COMPLEX 20 MIN: CPT | Mod: GP | Performed by: PHYSICAL THERAPIST

## 2017-12-14 RX ORDER — CYCLOBENZAPRINE HCL 5 MG
5 TABLET ORAL 3 TIMES DAILY PRN
Qty: 42 TABLET | Refills: 3 | Status: SHIPPED | OUTPATIENT
Start: 2017-12-14

## 2017-12-14 RX ORDER — SENNOSIDES 8.6 MG
1 TABLET ORAL 2 TIMES DAILY PRN
Status: DISCONTINUED | OUTPATIENT
Start: 2017-12-14 | End: 2017-12-15 | Stop reason: HOSPADM

## 2017-12-14 RX ORDER — OXYCODONE HYDROCHLORIDE 5 MG/1
5-10 TABLET ORAL EVERY 4 HOURS PRN
Qty: 18 TABLET | Refills: 0 | Status: SHIPPED | OUTPATIENT
Start: 2017-12-14

## 2017-12-14 RX ADMIN — LOSARTAN POTASSIUM 25 MG: 25 TABLET, FILM COATED ORAL at 08:43

## 2017-12-14 RX ADMIN — RALOXIFENE HYDROCHLORIDE 60 MG: 60 TABLET, FILM COATED ORAL at 08:44

## 2017-12-14 RX ADMIN — KETOROLAC TROMETHAMINE 15 MG: 15 INJECTION, SOLUTION INTRAMUSCULAR; INTRAVENOUS at 06:26

## 2017-12-14 RX ADMIN — DULOXETINE HYDROCHLORIDE 30 MG: 30 CAPSULE, DELAYED RELEASE ORAL at 20:01

## 2017-12-14 RX ADMIN — LOSARTAN POTASSIUM 25 MG: 25 TABLET, FILM COATED ORAL at 20:01

## 2017-12-14 RX ADMIN — OMEPRAZOLE 40 MG: 20 CAPSULE, DELAYED RELEASE ORAL at 08:43

## 2017-12-14 RX ADMIN — METOPROLOL SUCCINATE 25 MG: 25 TABLET, EXTENDED RELEASE ORAL at 08:43

## 2017-12-14 RX ADMIN — ACETAMINOPHEN 975 MG: 325 TABLET, FILM COATED ORAL at 08:43

## 2017-12-14 RX ADMIN — OXYCODONE HYDROCHLORIDE 5 MG: 5 TABLET ORAL at 00:56

## 2017-12-14 RX ADMIN — ACETAMINOPHEN 975 MG: 325 TABLET, FILM COATED ORAL at 16:31

## 2017-12-14 RX ADMIN — ACETAMINOPHEN 975 MG: 325 TABLET, FILM COATED ORAL at 23:38

## 2017-12-14 RX ADMIN — LEVOTHYROXINE SODIUM 75 MCG: 75 TABLET ORAL at 08:43

## 2017-12-14 RX ADMIN — OXYCODONE HYDROCHLORIDE 2.5 MG: 5 TABLET ORAL at 18:38

## 2017-12-14 RX ADMIN — OXYCODONE HYDROCHLORIDE 5 MG: 5 TABLET ORAL at 08:46

## 2017-12-14 RX ADMIN — KETOROLAC TROMETHAMINE 15 MG: 15 INJECTION, SOLUTION INTRAMUSCULAR; INTRAVENOUS at 12:09

## 2017-12-14 RX ADMIN — ACETAMINOPHEN 975 MG: 325 TABLET, FILM COATED ORAL at 00:23

## 2017-12-14 RX ADMIN — OXYCODONE HYDROCHLORIDE 5 MG: 5 TABLET ORAL at 12:48

## 2017-12-14 RX ADMIN — PSYLLIUM HUSK 0.52 G: 20 CAPSULE ORAL at 08:43

## 2017-12-14 RX ADMIN — CEFAZOLIN SODIUM 1 G: 1 INJECTION, SOLUTION INTRAVENOUS at 06:25

## 2017-12-14 RX ADMIN — SENNOSIDES 1 TABLET: 8.6 TABLET, FILM COATED ORAL at 20:01

## 2017-12-14 RX ADMIN — KETOROLAC TROMETHAMINE 15 MG: 15 INJECTION, SOLUTION INTRAMUSCULAR; INTRAVENOUS at 00:25

## 2017-12-14 ASSESSMENT — ACTIVITIES OF DAILY LIVING (ADL): PREVIOUS_RESPONSIBILITIES: HOUSEKEEPING;LAUNDRY;SHOPPING;MEDICATION MANAGEMENT;FINANCES;DRIVING

## 2017-12-14 NOTE — PLAN OF CARE
Problem: Patient Care Overview  Goal: Plan of Care/Patient Progress Review  Outcome: Improving  Vital signs stable, pt alert and oriented x4, hard of hearing, has bilateral hearing aides, same at bedside.  Lungs clear, on O2 at 3 lpm nasal cannula with capnography, sats 96%, IPI 10 CO2       35.  Pt rated pain at 4/10 , turned repositioned, dressing scant amount of dried drainage, same was marked in pacu.  CMS, baseline neuropathy in feet, anterior /top of feet has reddened blotches, no rash present, pt 's baseline.  Requested oxycodone at hs for pain.  Oriented to room, call light and plan of care.

## 2017-12-14 NOTE — CONSULTS
Hospitalist Consultation      Kimmy Shields MRN# [id number]   YOB: 1932 Age: 85 year old   Date of Admission: 12/13/2017     Requesting Physician: Dr. Hardy  Reason for consult:  Post operative management            Assessment and Plan:   This patient is a 85 year old female with a PMH significant for HTN, hypothyroidism, GERD, osteoporosis, and depression who is POD 1 s/p L4/5 interbody fusion.    1. S/p L4/5 interbody fusion: doing well, cont PT/OT and pain control as per primary    2. HTN: stable, continue PTA Metoprolol and Losartan with parameters     3. Hypothyroidism: continue Levothyroxine    4. GERD: resume Omeprazole     5. Leukocytosis: WBC of 13.6 this AM, likely secondary to receiving IV steroids intraoperatively and acute stress response from operation. No signs of underlying infection at this time.     6. Osteoporosis: continue PTA Raloxifene     7. Depression: continue PTA Omeprazole     DVT Prophylaxis: on PCDs as per primary  D/C planning: To home with assistance from daughters              History of Present Illness:   This patient is a 85 year old female who is POD 1 s/p L4/5 interbody fusion. Intra-op report reviewed and showed no intra-op complications. I/o's reviewed, currently net +2.7 L with good UOP since OR. Hgb stable this am at 10.2.     Overnight did pretty well, no complaints, VSS. Currently, today paco diet, pain controlled when laying and increases with movement as well sitting in the chair, denies chest pain, N/V, abdominal pain, or urinary symptoms. She reports some shortness of breath but this is her baseline. She has not passed flatus. O/w other medical problems have been stable, with no recent c/o illness.                Past Medical History:     Past Medical History:   Diagnosis Date     Anemia      Celiac disease      GERD (gastroesophageal reflux disease)      Hiatal hernia      Hypertension     NO cardioligist     Hypothyroidism      Neuropathy      Seasonal  allergies      Skin cancer           Past Surgical History:     Past Surgical History:   Procedure Laterality Date     ABDOMEN SURGERY  4-    hiatal hernia repair     HERNIA REPAIR  04-    hiatal hernia     LAPAROSCOPIC HERNIORRHAPHY GIANT PARAESOPHAGEAL N/A 4/18/2016    Procedure: LAPAROSCOPIC HERNIORRHAPHY GIANT PARAESOPHAGEAL;  Surgeon: Arden Mcmullen MD;  Location:  OR     MOHS MICROGRAPHIC PROCEDURE       TONSILLECTOMY            Social History:     Social History   Substance Use Topics     Smoking status: Never Smoker     Smokeless tobacco: Never Used     Alcohol use No          Family History:   Family history fully reviewed with patient and noncontributory.           Allergies:     Allergies   Allergen Reactions     Gluten Meal GI Disturbance     Sulfa Drugs Unknown     Adhesive Tape Rash          Medications:     Prior to Admission medications    Medication Sig Last Dose Taking? Auth Provider   Acetaminophen (TYLENOL EX ST ARTHRITIS PAIN PO) Take 2 tablets by mouth daily 12/13/2017 at 0700 Yes Reported, Patient   diphenhydrAMINE-acetaminophen (TYLENOL PM)  MG tablet Take 1 tablet by mouth nightly as needed for sleep 12/12/2017 at 2100 Yes Reported, Patient   loratadine (CLARITIN REDITABS) 10 MG ODT tab Take 10 mg by mouth daily 12/11/2017 at 0900 Yes Reported, Patient   METOPROLOL SUCCINATE ER PO Take 25 mg by mouth daily 12/13/2017 at 0700 Yes Reported, Patient   psyllium 0.52 G capsule Take 1 capsule by mouth daily 12/12/2017 at 1400 Yes Reported, Patient   DULOXETINE HCL PO Take 30 mg by mouth daily 12/12/2017 at 2100 Yes Reported, Patient   raloxifene (EVISTA) 60 MG tablet Take 60 mg by mouth daily 12/12/2017 at 0900 Yes Reported, Patient   ASPIRIN EC PO Take 81 mg by mouth daily 12/5/2017 Yes Reported, Patient   LOSARTAN POTASSIUM PO Take 25 mg by mouth 2 times daily 12/12/2017 at 0700 Yes Reported, Patient   LEVOTHYROXINE SODIUM PO Take 75 mcg by mouth daily  12/12/2017 at  "0700 Yes Reported, Patient   OMEPRAZOLE PO Take 40 mg by mouth every morning  12/13/2017 at 0700 Yes Reported, Patient   Ferrous Sulfate (IRON SUPPLEMENT PO) Take 65 mg by mouth 2 times daily (with meals)  12/11/2017 Yes Reported, Patient   calcium carbonate (OS-JEIMY 500 MG [place]. [place]) 500 MG tablet Take 500 mg by mouth 3 times daily  12/12/2017 at 1400 Yes Reported, Patient   Alum Hydroxide-Mag Carbonate (GAVISCON PO) Take 2 tablets by mouth 4 times daily  12/13/2017 at 0700 Yes Reported, Patient   Calcium-Vitamin D-Vitamin K 500-100-40 MG-UNT-MCG CHEW Take 3 tablets by mouth daily   Reported, Patient          Review of Systems:   A comprehensive greater than 10 system review of systems was carried out.  Pertinent positives and negatives are noted above.  Otherwise negative for contributory info.            Physical Exam:   Vitals were reviewed  Blood pressure 147/64, temperature 97  F (36.1  C), resp. rate 16, height 1.499 m (4' 11\"), weight 73.5 kg (162 lb 1.6 oz), SpO2 98 %.  Exam:    GENERAL:  Comfortable.  PSYCH: pleasant, oriented, No acute distress.  HEENT:  PERRLA. Normal conjunctiva, normal hearing, nasal mucosa and ropharynx are normal.  NECK:  Supple, no neck vein distention, adenopathy or bruits, normal thyroid.  HEART:  Normal S1, S2 with no murmur, no pericardial rub, S3 or S4.  LUNGS:  Clear to auscultation, normal respiratory effort.  ABDOMEN:  Soft, no hepatosplenomegaly, normal bowel sounds.  EXTREMITIES:  No pedal edema, +2 radial pulses bilateral and equal.  BACK: Lumbar spine dressing c/d/i  SKIN:  Dry to touch, No rash, wound or ulcerations.  NEUROLOGIC:  Nonfocal with normal cranial nerve and motor power and sensation.          Data:   Past 24 hours labs, studies, and imaging were reviewed.    Results for orders placed or performed during the hospital encounter of 12/13/17   XR Surgery PIPO L/T 5 Min Fluoro w Stills    Narrative    This exam was marked as non-reportable because it will not be " read by a   radiologist or a [facility] non-radiologist provider.             Creatinine   Result Value Ref Range    Creatinine 0.81 0.52 - 1.04 mg/dL    GFR Estimate 67 >60 mL/min/1.7m2    GFR Estimate If Black 81 >60 mL/min/1.7m2   Potassium   Result Value Ref Range    Potassium 3.6 3.4 - 5.3 mmol/L   Basic metabolic panel   Result Value Ref Range    Sodium 139 133 - 144 mmol/L    Potassium 4.5 3.4 - 5.3 mmol/L    Chloride 107 94 - 109 mmol/L    Carbon Dioxide 26 20 - 32 mmol/L    Anion Gap 6 3 - 14 mmol/L    Glucose 140 (H) 70 - 99 mg/dL    Urea Nitrogen 11 7 - 30 mg/dL    Creatinine 0.69 0.52 - 1.04 mg/dL    GFR Estimate 81 >60 mL/min/1.7m2    GFR Estimate If Black >90 >60 mL/min/1.7m2    Calcium 8.2 (L) 8.5 - 10.1 mg/dL   CBC with platelets differential   Result Value Ref Range    WBC 13.6 (H) 4.0 - 11.0 10e9/L    RBC Count 3.02 (L) 3.8 - 5.2 10e12/L    Hemoglobin 10.2 (L) 11.7 - 15.7 g/dL    Hematocrit 31.0 (L) 35.0 - 47.0 %     (H) 78 - 100 fl    MCH 33.8 (H) 26.5 - 33.0 pg    MCHC 32.9 31.5 - 36.5 g/dL    RDW 13.7 10.0 - 15.0 %    Platelet Count 251 150 - 450 10e9/L    Diff Method Automated Method     % Neutrophils 82.8 %    % Lymphocytes 8.2 %    % Monocytes 8.5 %    % Eosinophils 0.0 %    % Basophils 0.1 %    % Immature Granulocytes 0.4 %    Nucleated RBCs 0 0 /100    Absolute Neutrophil 11.3 (H) 1.6 - 8.3 10e9/L    Absolute Lymphocytes 1.1 0.8 - 5.3 10e9/L    Absolute Monocytes 1.2 0.0 - 1.3 10e9/L    Absolute Eosinophils 0.0 0.0 - 0.7 10e9/L    Absolute Basophils 0.0 0.0 - 0.2 10e9/L    Abs Immature Granulocytes 0.1 0 - 0.4 10e9/L    Absolute Nucleated RBC 0.0    ABO/Rh type and screen   Result Value Ref Range    ABO O     RH(D) Pos     Antibody Screen Neg     Test Valid Only At [facility]        Specimen Expires 12/16/2017        Hamida Quiñones PA-C    Pt discussed with Dr. Trevino who agrees with the care as discussed above.

## 2017-12-14 NOTE — PLAN OF CARE
Problem: Patient Care Overview  Goal: Plan of Care/Patient Progress Review  PT: PT naaal completed. Pt is status post lumbar fusion.   Discharge Planner PT   Patient plan for discharge: Pt lives in apartment and planning to have dtg stay with her following DC home.   Current status: Limited ambulation this am, in room, needing min A for bed mobility. FWW.   Barriers to return to prior living situation: needing A for bed mobility  Recommendations for discharge: home with dtg A if able to assist with bed mob PRN  Rationale for recommendations: Pt needing A with bed mobility, but able to ambulate and expect improvement quickly. Pt continues to benefit from IP PT to improve mobility skills.        Entered by: Ilda Ko 12/14/2017 10:05 AM         PT: PM session, improving, CGA for bed mobility. Able to increase distance to 75 feet. Pt mildly forgetful and confused at times. At end of session attempting to get back up to stand to go back to bed after stating would like to sit up in chair.

## 2017-12-14 NOTE — CONSULTS
[facility]  Pain Service Consultation   Text Page    Date of Admission:  12/13/2017    Assessment & Plan   Kimmy Shields is a 85 year old female who was admitted on 12/13/2017. I was asked to see the patient for pain management.    1) Acute pain s/p L4-L5 Minimally invasive direct lateral interbody fusion with discectomy  Severe back pain with radiculopathy due to L4/5 spondylolisthesis and degenerative disc disease  Musculoskeletal, neuropathic.    2)  Patient with chronic low back pain, NOT on chronic opioid therapy.  [facility] database review: no prescriptions in the last 12 months for controlled substances.  0 mg Daily Morphine Equivalent  Patient has a naive opioid tolerance.     Patient's opioid use thus far:   Hydromorphone 0.5 mg IV  Oxycodone 10 mg  = 25 mg Daily Morphine Equivalent  Fentanyl 150 mcg OR/PACU  EBL 76 cc  Post op HGB 10.2 gm/dl 12/14.    3)  Opioid induced side-effects:  -Sedation    4) Other/Related:    -GERD  -Restless leg syndrome  -Bilateral peripheral neuropathy  -Osteoporosis  -Celiac disease  -Hiatal hernia repair  -B cell CLL    PLAN:   1) Post-op care: Pt feels overwhelmed with instructions and information related to her post-op care.  Daughter will be present to receive post op education with patient prior to discharge and will assist pt at home with her recovery.   2)Multimodal Medication Therapy  Topical: None.  NSAIDS: Toradol 15 mg IV q 6 hours x 4 doses.  Muscle Relaxants: None.  Adjuvants:Tylenol 975 mg PO q 8 hours.  Antidepresants/anxiolytics: duloxatine 30 mg PO q HS as PTA.  Opioids: Decrease oxycodone to 2.5-5mg PO q 4 hours prn moderate to severe pain.  4)Non-medication interventions  Ice prn.  Pt prefers quiet, calm in room. Declines chaplan or aromatherapy.  5)Constipation Prophylaxis  Resume daily psylium as pta.  Senna 1 tab po bid prn.  6) DC safety  -Opioid Safety information included in After Visit Summary (AVS)  -Opioid taper at discharge  -Discharge  with family memeber or friend managing pain medications  -Recommend short suppy of Opioids only at discharge (3 days)    Time Spent on this Encounter   I spent 25 minutes in assessment of the patient and discussion with the patient and family.  Another 30 minutes in review of chart, documentation and discussion with the health care team.    Ladi PINEDA, CNS  Pain Management and Palliative Care  [facility]  Pgr: [phone]      Reason for Consult   Reason for consult: I was asked by Dr. Hardy to evaluate this patient for pain management.    Primary Care Physician   Primary Care Physician:Soledad Hensley  Pain Specialist: None.    Chief Complaint   Low back pain.    History is obtained from the patient and electronic health record    History of Present Illness   Kimmy Shields is a 85 year old female who presents with acute low back pain. She is POD#1 for L4-5 interbody fusion due to spondolisthesis and stenosis.     CURRENT PAIN:  Her pain is located in the lower lumbar spine and paraspinous muscles  It is described as Aching, Penetrating and Tender  She rates it as ranging between 0/10 and 8/10  The average is 4/10 on a scale of 0-10  Currently it is rated as 4/10  It improves by sitting down, calming and being quiet, reading books, although she is having difficult concentrating during her hospital stay.  It worsens by doing too much.  She has been compliant with the recommendations while in the hospital.      PAIN HISTORY:  The pain is mainly located in the low lumbar spine. Has had bilateral leg weakness and chronic peripheral neuropathy.  It is described as Aching, Burning and Tender  It improves by sitting.  It worsens by running errands, doing too much, driving long distances in the car.  She has been compliant with the recommendations while in the hospital.    Pt has documentation in H and P of SI joint pain, and L shoulder pathology.    PAST PAIN TREATMENT:    Medications:cymbalta,  tylenol arthritis, tylenol PM.  Non-phamacologic modalities: none.  Previous interventions/surgeries:none.           D.I.R.E Score: Patient Selection for Chronic Opioid Analgesia    For each factor, rate the patient's score from 1 - 3 based on the explanations on the right.       Diagnosis             2         1 = Benign chronic condition with minimal objective findings or no definite medical diagnosis.  Examples:  fibromyalgia, migraine, headaches, non-specific back pain.  2 = Slowly progressive condition concordant with moderate pain, or fixed condition with moderate objective findings.  Examples: failed back surgery syndrome, back pain with moderate degenerative changes, neuropathic pain.  3 = Advanced condition concordant with severe pain with objective findings.  Examples: severe ischemic vascular disease, advanced neuropathy, severe spinal stenosis.    Intractability             2         1 = Few therapies have been tried and the patient takes a passive role in his/her pain management process.   2 = Most costomary treatments have been tried but the patient is not fully engaged in the pain management process, or barriers prevent (insurance, transportation, medical illness)  3 = Patient fully engaged in a spectrum of appropriate treatments but with inadequate response.    Risk   (Risk = Total of P+C+R+S below)       Psychological             2         1 = Serious personality dysfunction or mental illness interfering with care.  Examples: personality disorder, severe affective disorder, significant personality issues.  2 = Personality or mental health interferes moderately.  Example: depression or anxiety disorder.  3 = Good communication with the clinic.  No significant personality dysfunction or mental illness.       Chemical      Health             3         1 = Active or very recent use of illicit drugs, excessive alcohol, or prescription drug abuse.  2 = Chemical coper (uses  medications to cope with stress) or history of chemical dependency in remission.  3 = No CD history.  Not drug-focused or chemically reliant       Reliability             2         1 = History of numerous problems: medication misuse, missed appointments, rarely follows through.  2 = Occasional difficulties with compliance, but generally reliable.  3 = Highly reliable patient with medications, appointments and treatment.       Social      Support             2         1= Life in chaos.  Little family support and few close relationships.  Loss of most normal life roles.  2 = Reduction in some relationships and life roles.  3 = Supportive family/close relationships.  Involved in work or school and no social isolation.    Efficacy score             2         1 = Poor function or minimal pain relief despite moderate to high doses.  2 = Moderate benefit with function improved in a number of ways (or insufficient info - hasn't tried opioid yet or very low doses or too short a trial.  3 = Good improvement in pain and function and quality of life with stable doses over time.                                    15    Total score = D + I + R + E    Score 7-13: Not a suitable candidate for long-term opioid analgesia  Score 14-21: May be a good candidate for long-term opioid analgesia    Copyright 2013 Naman Flannery MD, The DIRE Score: Predicting Outcomes of Opioid Prescribing for Chronic Pain. The Journal of Pain. 7(9) (September), 2006:671-681    Past Medical History   I have reviewed this patient's medical history and updated it with pertinent information if needed.   Past Medical History:   Diagnosis Date     Anemia      Celiac disease      GERD (gastroesophageal reflux disease)      Hiatal hernia      Hypertension     NO cardioligist     Hypothyroidism      Neuropathy      Seasonal allergies      Skin cancer    B cell CLL per H and P.    Past Surgical History   I have reviewed this patient's surgical history and updated it  with pertinent information if needed.  Past Surgical History:   Procedure Laterality Date     ABDOMEN SURGERY  4-    hiatal hernia repair     FUSION SPINE POSTERIOR MINIMALLY INVASIVE ONE LEVEL N/A 12/13/2017    Procedure: FUSION SPINE POSTERIOR MINIMALLY INVASIVE ONE LEVEL;  L4/5 Direct Lateral Lumbar Interbody Fusion with discectomy  L4/5  Posterior minimally invasive pedicle screw placement and posterolateral instrumentation and fusion   Epidural steroid injection;  Surgeon: Sisi Hardy MD;  Location: [facility] OR     HERNIA REPAIR  04-    hiatal hernia     INJECT STEROID (LOCATION) N/A 12/13/2017    Procedure: INJECT STEROID (LOCATION);;  Surgeon: Sisi Hardy MD;  Location: [facility] OR     LAPAROSCOPIC HERNIORRHAPHY GIANT PARAESOPHAGEAL N/A 4/18/2016    Procedure: LAPAROSCOPIC HERNIORRHAPHY GIANT PARAESOPHAGEAL;  Surgeon: Arden Mcmullen MD;  Location: [facility] OR     MOHS MICROGRAPHIC PROCEDURE       TONSILLECTOMY           Prior to Admission Medications   Prior to Admission Medications   Prescriptions Last Dose Informant Patient Reported? Taking?   ASPIRIN EC PO 12/5/2017  Yes Yes   Sig: Take 81 mg by mouth daily   Acetaminophen (TYLENOL EX ST ARTHRITIS PAIN PO) 12/13/2017 at 0700  Yes Yes   Sig: Take 2 tablets by mouth daily   Alum Hydroxide-Mag Carbonate (GAVISCON PO) 12/13/2017 at 0700  Yes Yes   Sig: Take 2 tablets by mouth 4 times daily    Calcium-Vitamin D-Vitamin K 500-100-40 MG-UNT-MCG CHEW   Yes No   Sig: Take 3 tablets by mouth daily   DULOXETINE HCL PO 12/12/2017 at 2100  Yes Yes   Sig: Take 30 mg by mouth daily   Ferrous Sulfate (IRON SUPPLEMENT PO) 12/11/2017  Yes Yes   Sig: Take 65 mg by mouth 2 times daily (with meals)    LEVOTHYROXINE SODIUM PO 12/12/2017 at 0700  Yes Yes   Sig: Take 75 mcg by mouth daily    LOSARTAN POTASSIUM PO 12/12/2017 at 0700  Yes Yes   Sig: Take 25 mg by mouth 2 times daily   METOPROLOL SUCCINATE ER PO 12/13/2017 at 0700  Yes Yes   Sig: Take 25 mg by mouth daily    OMEPRAZOLE PO 2017 at 0700  Yes Yes   Sig: Take 40 mg by mouth every morning    calcium carbonate (OS-JEIMY 500 MG [place]. [place]) 500 MG tablet 2017 at 1400  Yes Yes   Sig: Take 500 mg by mouth 3 times daily    diphenhydrAMINE-acetaminophen (TYLENOL PM)  MG tablet 2017 at 2100  Yes Yes   Sig: Take 1 tablet by mouth nightly as needed for sleep   loratadine (CLARITIN REDITABS) 10 MG ODT tab 2017 at 0900  Yes Yes   Sig: Take 10 mg by mouth daily   psyllium 0.52 G capsule 2017 at 1400  Yes Yes   Sig: Take 1 capsule by mouth daily   raloxifene (EVISTA) 60 MG tablet 2017 at 0900  Yes Yes   Sig: Take 60 mg by mouth daily      Facility-Administered Medications: None     Allergies   Allergies   Allergen Reactions     Gluten Meal GI Disturbance     Sulfa Drugs Unknown     Adhesive Tape Rash       Social History   I have reviewed this patient's social history and updated it with pertinent information if needed. Kimmy Shields  reports that she has never smoked. She has never used smokeless tobacco. She reports that she does not drink alcohol or use illicit drugs.     Pt is .   of cancer in . Lives in an apartment by herself. Has 2 adult daughters that will help her after discharge.  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     Family History   I have reviewed this patient's family history and updated it with pertinent information if needed.   History reviewed. No pertinent family history.    Family history of addiction - not disclosed.    Review of Systems   C: NEGATIVE for fever, chills, change in weight  E/M: NEGATIVE for ear, mouth and throat problems  R: NEGATIVE for significant cough or SOB  CV: NEGATIVE for chest pain, palpitations or peripheral edema   Denies Bowel or bladder dysfunction    Physical Exam   Temp:  [96.5  F (35.8  C)-98.5  F (36.9  C)] 98  F (36.7  C)  Heart Rate:  [63-89]  69  Resp:  [6-18] 16  BP: (115-168)/() 115/52  SpO2:  [91 %-100 %] 92 %  162 lbs 1.6 oz  GEN:  Alert, oriented x 3, appears comfortable, NAD.  HEENT:  Normocephalic/atraumatic, no scleral icterus, no nasal discharge, mouth dry.  CV:  RRR, S1, S2; no murmurs or other irregularities noted.  +3 DP/PT pulses bilatererally; no edema BLE.  RESP:  Clear to auscultation bilaterally without rales/rhonchi/wheezing/retractions.  Symmetric chest rise on inhalation noted.  Normal respiratory effort.  ABD:  Rounded, soft, non-tender/non-distended.  +BS  EXT:  Edema & pulses as noted above.  CMS intact x 4.     M/S:   Tender to palpation lower lumbar spine and paraspinous muscles.    SKIN:  Dry to touch, incision cdi.  NEURO: Symmetric strength +5/5.  No change post op to pt's bilateral peripheral neuropathy. There is no area of allodynia or hyperesthesia.  PAIN BEHAVIOR: Cooperative  Psych:  Normal affect. Overwhelmed and confused with all of the information she has been given today from members of the healthcare team. Cooperative, conversant appropriately.       Data   Results for orders placed or performed during the hospital encounter of 12/13/17 (from the past 24 hour(s))   XR Surgery PIPO L/T 5 Min Fluoro w Stills    Narrative    This exam was marked as non-reportable because it will not be read by a   radiologist or a [place] non-radiologist provider.             Hospitalist IP Consult: Patient to be seen: Routine - within 24 hours; Hospitalist Consult; Consultant may enter orders: Yes    Narrative    Flor Quiñones PA-C     12/14/2017 12:22 PM  Hospitalist Consultation      Kimmy Shields MRN# [id number]   YOB: 1932 Age: 85 year old   Date of Admission: 12/13/2017     Requesting Physician: Dr. Hardy  Reason for consult:  Post operative management            Assessment and Plan:   This patient is a 85 year old female with a PMH significant for   HTN, hypothyroidism, GERD, osteoporosis, and  depression who is   POD 1 s/p L4/5 interbody fusion.    1. S/p L4/5 interbody fusion: doing well, cont PT/OT and pain   control as per primary    2. HTN: stable, continue PTA Metoprolol and Losartan with   parameters     3. Hypothyroidism: continue Levothyroxine    4. GERD: resume Omeprazole     5. Leukocytosis: WBC of 13.6 this AM, likely secondary to   receiving IV steroids intraoperatively and acute stress response   from operation. No signs of underlying infection at this time.     6. Osteoporosis: continue PTA Raloxifene     7. Depression: continue PTA Omeprazole     DVT Prophylaxis: on PCDs as per primary  D/C planning: To home with assistance from daughters              History of Present Illness:   This patient is a 85 year old female who is POD 1 s/p L4/5   interbody fusion. Intra-op report reviewed and showed no intra-op   complications. I/o's reviewed, currently net +2.7 L with good UOP   since OR. Hgb stable this am at 10.2.     Overnight did pretty well, no complaints, VSS. Currently, today   paco diet, pain controlled when laying and increases with movement   as well sitting in the chair, denies chest pain, N/V, abdominal   pain, or urinary symptoms. She reports some shortness of breath   but this is her baseline. She has not passed flatus. O/w other   medical problems have been stable, with no recent c/o illness.                Past Medical History:     Past Medical History:   Diagnosis Date     Anemia      Celiac disease      GERD (gastroesophageal reflux disease)      Hiatal hernia      Hypertension     NO cardioligist     Hypothyroidism      Neuropathy      Seasonal allergies      Skin cancer           Past Surgical History:     Past Surgical History:   Procedure Laterality Date     ABDOMEN SURGERY  4-    hiatal hernia repair     HERNIA REPAIR  04-    hiatal hernia     LAPAROSCOPIC HERNIORRHAPHY GIANT PARAESOPHAGEAL N/A 4/18/2016    Procedure: LAPAROSCOPIC HERNIORRHAPHY GIANT  PARAESOPHAGEAL;    Surgeon: Arden Mcmullen MD;  Location: [facility] OR     MOHS MICROGRAPHIC PROCEDURE       TONSILLECTOMY            Social History:     Social History   Substance Use Topics     Smoking status: Never Smoker     Smokeless tobacco: Never Used     Alcohol use No          Family History:   Family history fully reviewed with patient and noncontributory.           Allergies:     Allergies   Allergen Reactions     Gluten Meal GI Disturbance     Sulfa Drugs Unknown     Adhesive Tape Rash          Medications:     Prior to Admission medications    Medication Sig Last Dose Taking? Auth Provider   Acetaminophen (TYLENOL EX ST ARTHRITIS PAIN PO) Take 2 tablets by   mouth daily 12/13/2017 at 0700 Yes Reported, Patient   diphenhydrAMINE-acetaminophen (TYLENOL PM)  MG tablet Take   1 tablet by mouth nightly as needed for sleep 12/12/2017 at 2100   Yes Reported, Patient   loratadine (CLARITIN REDITABS) 10 MG ODT tab Take 10 mg by mouth   daily 12/11/2017 at 0900 Yes Reported, Patient   METOPROLOL SUCCINATE ER PO Take 25 mg by mouth daily 12/13/2017   at 0700 Yes Reported, Patient   psyllium 0.52 G capsule Take 1 capsule by mouth daily 12/12/2017   at 1400 Yes Reported, Patient   DULOXETINE HCL PO Take 30 mg by mouth daily 12/12/2017 at 2100   Yes Reported, Patient   raloxifene (EVISTA) 60 MG tablet Take 60 mg by mouth daily   12/12/2017 at 0900 Yes Reported, Patient   ASPIRIN EC PO Take 81 mg by mouth daily 12/5/2017 Yes Reported,   Patient   LOSARTAN POTASSIUM PO Take 25 mg by mouth 2 times daily   12/12/2017 at 0700 Yes Reported, Patient   LEVOTHYROXINE SODIUM PO Take 75 mcg by mouth daily  12/12/2017 at   0700 Yes Reported, Patient   OMEPRAZOLE PO Take 40 mg by mouth every morning  12/13/2017 at   0700 Yes Reported, Patient   Ferrous Sulfate (IRON SUPPLEMENT PO) Take 65 mg by mouth 2 times   daily (with meals)  12/11/2017 Yes Reported, Patient   calcium carbonate (OS-JEIMY 500 MG [place]. [place]) 500 MG tablet Take  "500   mg by mouth 3 times daily  12/12/2017 at 1400 Yes Reported,   Patient   Alum Hydroxide-Mag Carbonate (GAVISCON PO) Take 2 tablets by   mouth 4 times daily  12/13/2017 at 0700 Yes Reported, Patient   Calcium-Vitamin D-Vitamin K 500-100-40 MG-UNT-MCG CHEW Take 3   tablets by mouth daily   Reported, Patient          Review of Systems:   A comprehensive greater than 10 system review of systems was   carried out.  Pertinent positives and negatives are noted above.    Otherwise negative for contributory info.            Physical Exam:   Vitals were reviewed  Blood pressure 147/64, temperature 97  F (36.1  C), resp. rate   16, height 1.499 m (4' 11\"), weight 73.5 kg (162 lb 1.6 oz), SpO2   98 %.  Exam:    GENERAL:  Comfortable.  PSYCH: pleasant, oriented, No acute distress.  HEENT:  PERRLA. Normal conjunctiva, normal hearing, nasal mucosa   and ropharynx are normal.  NECK:  Supple, no neck vein distention, adenopathy or bruits,   normal thyroid.  HEART:  Normal S1, S2 with no murmur, no pericardial rub, S3 or   S4.  LUNGS:  Clear to auscultation, normal respiratory effort.  ABDOMEN:  Soft, no hepatosplenomegaly, normal bowel sounds.  EXTREMITIES:  No pedal edema, +2 radial pulses bilateral and   equal.  BACK: Lumbar spine dressing c/d/i  SKIN:  Dry to touch, No rash, wound or ulcerations.  NEUROLOGIC:  Nonfocal with normal cranial nerve and motor power   and sensation.          Data:   Past 24 hours labs, studies, and imaging were reviewed.    Results for orders placed or performed during the hospital   encounter of 12/13/17   XR Surgery PIPO L/T 5 Min Fluoro w Stills    Narrative    This exam was marked as non-reportable because it will not be   read by a   radiologist or a [place] non-radiologist provider.             Creatinine   Result Value Ref Range    Creatinine 0.81 0.52 - 1.04 mg/dL    GFR Estimate 67 >60 mL/min/1.7m2    GFR Estimate If Black 81 >60 mL/min/1.7m2   Potassium   Result Value Ref Range    " Potassium 3.6 3.4 - 5.3 mmol/L   Basic metabolic panel   Result Value Ref Range    Sodium 139 133 - 144 mmol/L    Potassium 4.5 3.4 - 5.3 mmol/L    Chloride 107 94 - 109 mmol/L    Carbon Dioxide 26 20 - 32 mmol/L    Anion Gap 6 3 - 14 mmol/L    Glucose 140 (H) 70 - 99 mg/dL    Urea Nitrogen 11 7 - 30 mg/dL    Creatinine 0.69 0.52 - 1.04 mg/dL    GFR Estimate 81 >60 mL/min/1.7m2    GFR Estimate If Black >90 >60 mL/min/1.7m2    Calcium 8.2 (L) 8.5 - 10.1 mg/dL   CBC with platelets differential   Result Value Ref Range    WBC 13.6 (H) 4.0 - 11.0 10e9/L    RBC Count 3.02 (L) 3.8 - 5.2 10e12/L    Hemoglobin 10.2 (L) 11.7 - 15.7 g/dL    Hematocrit 31.0 (L) 35.0 - 47.0 %     (H) 78 - 100 fl    MCH 33.8 (H) 26.5 - 33.0 pg    MCHC 32.9 31.5 - 36.5 g/dL    RDW 13.7 10.0 - 15.0 %    Platelet Count 251 150 - 450 10e9/L    Diff Method Automated Method     % Neutrophils 82.8 %    % Lymphocytes 8.2 %    % Monocytes 8.5 %    % Eosinophils 0.0 %    % Basophils 0.1 %    % Immature Granulocytes 0.4 %    Nucleated RBCs 0 0 /100    Absolute Neutrophil 11.3 (H) 1.6 - 8.3 10e9/L    Absolute Lymphocytes 1.1 0.8 - 5.3 10e9/L    Absolute Monocytes 1.2 0.0 - 1.3 10e9/L    Absolute Eosinophils 0.0 0.0 - 0.7 10e9/L    Absolute Basophils 0.0 0.0 - 0.2 10e9/L    Abs Immature Granulocytes 0.1 0 - 0.4 10e9/L    Absolute Nucleated RBC 0.0    ABO/Rh type and screen   Result Value Ref Range    ABO O     RH(D) Pos     Antibody Screen Neg     Test Valid Only At [facility]        Specimen Expires 12/16/2017        Hamida Quiñones PA-C    Pt discussed with Dr. Trevino who agrees with the care as   discussed above.        Basic metabolic panel   Result Value Ref Range    Sodium 139 133 - 144 mmol/L    Potassium 4.5 3.4 - 5.3 mmol/L    Chloride 107 94 - 109 mmol/L    Carbon Dioxide 26 20 - 32 mmol/L    Anion Gap 6 3 - 14 mmol/L    Glucose 140 (H) 70 - 99 mg/dL    Urea Nitrogen 11 7 - 30 mg/dL    Creatinine 0.69 0.52 - 1.04 mg/dL    GFR  Estimate 81 >60 mL/min/1.7m2    GFR Estimate If Black >90 >60 mL/min/1.7m2    Calcium 8.2 (L) 8.5 - 10.1 mg/dL   CBC with platelets differential   Result Value Ref Range    WBC 13.6 (H) 4.0 - 11.0 10e9/L    RBC Count 3.02 (L) 3.8 - 5.2 10e12/L    Hemoglobin 10.2 (L) 11.7 - 15.7 g/dL    Hematocrit 31.0 (L) 35.0 - 47.0 %     (H) 78 - 100 fl    MCH 33.8 (H) 26.5 - 33.0 pg    MCHC 32.9 31.5 - 36.5 g/dL    RDW 13.7 10.0 - 15.0 %    Platelet Count 251 150 - 450 10e9/L    Diff Method Automated Method     % Neutrophils 82.8 %    % Lymphocytes 8.2 %    % Monocytes 8.5 %    % Eosinophils 0.0 %    % Basophils 0.1 %    % Immature Granulocytes 0.4 %    Nucleated RBCs 0 0 /100    Absolute Neutrophil 11.3 (H) 1.6 - 8.3 10e9/L    Absolute Lymphocytes 1.1 0.8 - 5.3 10e9/L    Absolute Monocytes 1.2 0.0 - 1.3 10e9/L    Absolute Eosinophils 0.0 0.0 - 0.7 10e9/L    Absolute Basophils 0.0 0.0 - 0.2 10e9/L    Abs Immature Granulocytes 0.1 0 - 0.4 10e9/L    Absolute Nucleated RBC 0.0

## 2017-12-14 NOTE — CONSULTS
Care Transitions Team: Following for CC, discharge planning, and disposition.        Per chart review MD has consulted [facility] for potential for disposition concerns.     Per PT  POD 1 assessment     Discharge Planner PT   Patient plan for discharge: Pt lives in apartment and planning to have dtg stay with her following DC home.   Current status: Limited ambulation this am, in room, needing min A for bed mobility. FWW.   Barriers to return to prior living situation: needing A for bed mobility  Recommendations for discharge: home with dtg A if able to assist with bed mob PRN  Rationale for recommendations: Pt needing A with bed mobility, but able to ambulate and expect improvement quickly. Pt continues to benefit from IP PT to improve mobility skills.     Will follow up on POD 2 and validate for continued plan of care or other needs.     Camila Higgins RN   Care Transitions Team  [phone]

## 2017-12-14 NOTE — PROGRESS NOTES
12/14/17 0909   Quick Adds   Type of Visit Initial PT Evaluation   Living Environment   Lives With alone   Living Arrangements apartment   Number of Stairs to Enter Home 0   Number of Stairs Within Home 0   Transportation Available car;family or friend will provide   Living Environment Comment Pt's dtg will be staying with patient   Self-Care   Usual Activity Tolerance good   Current Activity Tolerance fair   Regular Exercise yes   Activity/Exercise Type walking   Exercise Amount/Frequency daily   Equipment Currently Used at Home walker, rolling;shower chair   Functional Level Prior   Ambulation 0-->independent   Transferring 0-->independent   Toileting 0-->independent   Bathing 0-->independent   Dressing 0-->independent   Eating 0-->independent   Communication 0-->understands/communicates without difficulty   Swallowing 0-->swallows foods/liquids without difficulty   Cognition 0 - no cognition issues reported   Fall history within last six months no   Number of times patient has fallen within last six months (2 falls previous)   Prior Functional Level Comment Pt reports doing own cares and driving.   General Information   Onset of Illness/Injury or Date of Surgery - Date 12/13/17   Referring Physician Dr. Mead   Patient/Family Goals Statement Pt hoping to D/C home with daughter tomorrow.   Pertinent History of Current Problem (include personal factors and/or comorbidities that impact the POC) Pt is status post L4/5  Direct lateral lumbar interbody fusion with discectomy and L4/5  Posterior minimally invasive pedicle screw placement and posterolateral instrumentation and fusion.   Precautions/Limitations spinal precautions   General Info Comments Pt agreeable to PT   Cognitive Status Examination   Orientation orientation to person, place and time   Level of Consciousness alert   Follows Commands and Answers Questions 100% of the time;able to follow multistep instructions   Personal Safety and Judgment intact  "  Memory intact   Pain Assessment   Patient Currently in Pain Yes, see Vital Sign flowsheet   Integumentary/Edema   Integumentary/Edema Comments as expected following spinal surgery   Posture    Posture Forward head position;Protracted shoulders   Range of Motion (ROM)   ROM Comment pain with hip flexion, tolerating 90 degree positioning   Strength   Strength Comments demos 3/5 in hips, knees and ankles   Bed Mobility   Bed Mobility Comments Min A   Transfer Skills   Transfer Comments min A, FWW   Gait   Gait Comments CGA/min A x 2, FWW   Balance   Balance Comments no LOB but heavy reliance on FWW   Sensory Examination   Sensory Perception Comments baseline neuropathy to B feet   Modality Interventions   Planned Modality Interventions Cryotherapy   General Therapy Interventions   Planned Therapy Interventions balance training;bed mobility training;gait training;strengthening;transfer training;risk factor education;home program guidelines;progressive activity/exercise   Clinical Impression   Criteria for Skilled Therapeutic Intervention yes, treatment indicated   PT Diagnosis decreased functional mobility status post spine fusion   Influenced by the following impairments pain, decreased strength, spinal precautions   Functional limitations due to impairments decreased transfers, ambulation   Clinical Presentation Stable/Uncomplicated   Clinical Presentation Rationale improving as expected following spinal surgery   Clinical Decision Making (Complexity) Low complexity   Therapy Frequency` 2 times/day   Predicted Duration of Therapy Intervention (days/wks) 3 days   Anticipated Discharge Disposition Home with Assist   Risk & Benefits of therapy have been explained Yes   Patient, Family & other staff in agreement with plan of care Yes   [facility] AM-PAC TM \"6 Clicks\"   2016, Trustees of [facility], under license to [employer].  All rights reserved.   6 Clicks Short Forms Basic Mobility Inpatient Short " "Form   [facility] AM-PAC  \"6 Clicks\" V.2 Basic Mobility Inpatient Short Form   1. Turning from your back to your side while in a flat bed without using bedrails? 3 - A Little   2. Moving from lying on your back to sitting on the side of a flat bed without using bedrails? 2 - A Lot   3. Moving to and from a bed to a chair (including a wheelchair)? 3 - A Little   4. Standing up from a chair using your arms (e.g., wheelchair, or bedside chair)? 3 - A Little   5. To walk in hospital room? 2 - A Lot   6. Climbing 3-5 steps with a railing? 2 - A Lot   Basic Mobility Raw Score (Score out of 24.Lower scores equate to lower levels of function) 15   Total Evaluation Time   Total Evaluation Time (Minutes) 10     "

## 2017-12-14 NOTE — PROGRESS NOTES
DAILY PROGRESS NOTE    Kimmy Shields is a 85 year old old female admitted on 12/13/2017  8:47 AM.    Subjective  Comfortable      Objective    AAOx3, ABREU , f/c 4/4   Ambulating, radiculopathy improved     Hemoglobin   Date Value Ref Range Status   12/14/2017 10.2 (L) 11.7 - 15.7 g/dL Final   ]      Impression / Plan       Plan for today:    Patient doing well  Ambulate with help  PT OT, possibly clearance   D/c jill this am  Full diet  Today   transition to PO meds today     D/c home most likely tomorrow

## 2017-12-14 NOTE — DISCHARGE SUMMARY
Discharge Summary    Attending Physician:  Sisi Hardy MD  Admit Date: 12/13/2017    Discharge Date: 12/15/17  Primary Care Physician: Soledad Hensley    Discharge Diagnoses  [unfilled]    Discharge Exam    AAOx3 ABREU f/c all for , no weakness, No new sensory deficit, incision C/D/I        Preliminary Discharge Medications    This list of medications is preliminary and tentative.  Please see the After Visit Summary for the final and accurate medication list.    [unfilled]    Procedures Performed and Findings  Procedure(s):  L4/5 Direct Lateral Lumbar Interbody Fusion with discectomy  L4/5  Posterior minimally invasive pedicle screw placement and posterolateral instrumentation and fusion   Epidural steroid injection - Wound Class: I-Clean   - Wound Class: I-Clean       Consultations Obtained  HOSPITALIST IP CONSULT  OCCUPATIONAL THERAPY ADULT IP CONSULT  PHYSICAL THERAPY ADULT IP CONSULT  PAIN MANAGEMENT ADULT IP CONSULT  SOCIAL WORK IP CONSULT    Code Status   Prior    Discharge Disposition        Diet on Discharge   Regular    Activity on Discharge   Your activity upon discharge: Ad peewee within following limitations:  No excessive activities   No Bending, Twisting, climbing, Crawling,   No lifting more than 8 lb for 2 weeks, or 15 lb for 2 months or 25 lb for 4 months or 35 lb for 6 months  Brace for riding cars for 4-6 months      Discharge Instructions  Per [facility] instruction : [email or web address]        Follow-Up Scheduled    Follow up in 2 weeks with me or PCP for wound check ( patient's choice) if patients want to go to PCP for wound check, then f/u in my office  In 3 months      Hospital Course   Hospital Course unremarkable , adequate ambulation in due time, pain controlled , cleared by PT/OT, no events         /c

## 2017-12-14 NOTE — PLAN OF CARE
Problem: Patient Care Overview  Goal: Plan of Care/Patient Progress Review  OT: Evaluation and treatment initiated. Pt lives independently in an apartment. Prior pt reported independence in all ADLs and most IADLs. Pt s/p L4/5 interbody fusion  Discharge Planner OT   Patient plan for discharge: Home with daughter assist   Current status: Pt completed lower body dressing with overall minimum assist, and required >2 verbal cues to maintain spinal precautions.  Pt ambulated within the room to the bedside chair with CGA and extra time. Educated on walker safety, as pt demonstrated difficulty with walker safety during functional transfers. Pt returned to supine with Minimum assist and >2 verbal cues for log roll technique.  Barriers to return to prior living situation: cognition (will assess next session); current level of assist   Recommendations for discharge: Pending progress in therapy and cognition assessment, anticipate home with daughter assist for I/ADLs  Rationale for recommendations: Patient's daughter will stay with patient and provide assist        Entered by: Jojo Larson 12/14/2017 4:15 PM

## 2017-12-14 NOTE — PLAN OF CARE
Problem: Patient Care Overview  Goal: Plan of Care/Patient Progress Review  Outcome: Improving  Noc RN- Pt sleeping well between cares, pain controlled with 5mg oxycodone & toradol. Capnography with minimal alarm triggers. Small amt moist drainage to dressing. Baseline neuropathy. Vital signs stable

## 2017-12-14 NOTE — PROGRESS NOTES
12/14/17 1530   Quick Adds   Type of Visit Initial Occupational Therapy Evaluation   Living Environment   Lives With alone   Living Arrangements apartment   Home Accessibility (Walk in shower)   Transportation Available car;family or friend will provide   Living Environment Comment Pt's dtg will be staying with patient, for as many days as needed, per pt    Self-Care   Usual Activity Tolerance good   Current Activity Tolerance moderate   Regular Exercise yes   Activity/Exercise Type walking   Exercise Amount/Frequency daily   Equipment Currently Used at Home walker, rolling;shower chair   Functional Level Prior   Ambulation 0-->independent   Transferring 0-->independent   Toileting 0-->independent   Bathing 0-->independent   Dressing 0-->independent   Fall history within last six months no   General Information   Onset of Illness/Injury or Date of Surgery - Date 12/13/17   Referring Physician Antony   Patient/Family Goals Statement Home   Additional Occupational Profile Info/Pertinent History of Current Problem Pt is status post L4/5  Direct lateral lumbar interbody fusion with discectomy and L4/5  Posterior minimally invasive pedicle screw placement and posterolateral instrumentation and fusion.   Precautions/Limitations fall precautions;spinal precautions   Cognitive Status Examination   Orientation orientation to person, place and time   Level of Consciousness alert   Visual Perception   Visual Perception Wears glasses   Sensory Examination   Sensory Quick Adds (Numbness and tingling in feet )   Pain Assessment   Patient Currently in Pain Yes, see Vital Sign flowsheet   Mobility   Bed Mobility Bed mobility skill: Sit to supine;Bed mobility skill: Supine to sit   Bed Mobility Skill: Sit to Supine   Level of [place]: Sit/Supine minimum assist (75% patients effort)   Bed Mobility Skill: Supine to Sit   Level of [place]: Supine/Sit contact guard   Transfer Skills   Transfer Transfer Safety Analysis  "Bed/Chair;Transfer Skill: Stand to Sit;Transfer Safety Analysis Sit/Stand   Transfer Skill: Bed to Chair/Chair to Bed   Level of [place]: Bed to Chair contact guard   Transfer Skill: Sit to Stand   Level of [place]: Sit/Stand contact guard   Lower Body Dressing   Level of [place]: Dress Lower Body minimum assist (75% patients effort)   Instrumental Activities of Daily Living (IADL)   Previous Responsibilities housekeeping;laundry;shopping;medication management;finances;driving  (Meals delivered and prepares some meals. )   General Therapy Interventions   Planned Therapy Interventions ADL retraining;IADL retraining;transfer training   Clinical Impression   Criteria for Skilled Therapeutic Interventions Met yes, treatment indicated   OT Diagnosis Decreased ADLs and IADLs, functional transfers   Influenced by the following impairments pain, spinal precautions   Assessment of Occupational Performance 1-3 Performance Deficits   Identified Performance Deficits Decreased ADLs and IADLs (dressing, bathing, toileting), functional transfers   Clinical Decision Making (Complexity) Low complexity   Therapy Frequency daily   Predicted Duration of Therapy Intervention (days/wks) 3 days   Anticipated Discharge Disposition Home with Assist   Risks and Benefits of Treatment have been explained. Yes   Patient, Family & other staff in agreement with plan of care Yes   [facility] TM \"6 Clicks\"   2016, Trustees of [facility], under license to [employer]  All rights reserved.   6 Clicks Short Forms Daily Activity Inpatient Short Form   [facility]  \"6 Clicks\" Daily Activity Inpatient Short Form   1. Putting on and taking off regular lower body clothing? 3 - A Little   2. Bathing (including washing, rinsing, drying)? 3 - A Little   3. Toileting, which includes using toilet, bedpan or urinal? 3 - A Little   4. Putting on and taking off regular upper body clothing? 4 - None   5. Taking " care of personal grooming such as brushing teeth? 3 - A Little   6. Eating meals? 4 - None   Daily Activity Raw Score (Score out of 24.Lower scores equate to lower levels of function) 20   Total Evaluation Time   Total Evaluation Time (Minutes) 8

## 2017-12-15 ENCOUNTER — APPOINTMENT (OUTPATIENT)
Dept: OCCUPATIONAL THERAPY | Facility: CLINIC | Age: 82
DRG: 454 | End: 2017-12-15
Attending: NEUROLOGICAL SURGERY
Payer: MEDICARE

## 2017-12-15 ENCOUNTER — APPOINTMENT (OUTPATIENT)
Dept: PHYSICAL THERAPY | Facility: CLINIC | Age: 82
DRG: 454 | End: 2017-12-15
Attending: NEUROLOGICAL SURGERY
Payer: MEDICARE

## 2017-12-15 VITALS
WEIGHT: 162.1 LBS | RESPIRATION RATE: 16 BRPM | OXYGEN SATURATION: 93 % | SYSTOLIC BLOOD PRESSURE: 165 MMHG | DIASTOLIC BLOOD PRESSURE: 70 MMHG | BODY MASS INDEX: 32.68 KG/M2 | TEMPERATURE: 98.3 F | HEIGHT: 59 IN

## 2017-12-15 PROCEDURE — 97530 THERAPEUTIC ACTIVITIES: CPT | Mod: GP | Performed by: PHYSICAL THERAPIST

## 2017-12-15 PROCEDURE — 25000132 ZZH RX MED GY IP 250 OP 250 PS 637: Mod: GY | Performed by: INTERNAL MEDICINE

## 2017-12-15 PROCEDURE — 40000193 ZZH STATISTIC PT WARD VISIT: Performed by: PHYSICAL THERAPIST

## 2017-12-15 PROCEDURE — 25000132 ZZH RX MED GY IP 250 OP 250 PS 637: Mod: GY | Performed by: PHYSICIAN ASSISTANT

## 2017-12-15 PROCEDURE — 25000132 ZZH RX MED GY IP 250 OP 250 PS 637: Mod: GY | Performed by: NEUROLOGICAL SURGERY

## 2017-12-15 PROCEDURE — A9270 NON-COVERED ITEM OR SERVICE: HCPCS | Mod: GY | Performed by: PHYSICIAN ASSISTANT

## 2017-12-15 PROCEDURE — A9270 NON-COVERED ITEM OR SERVICE: HCPCS | Mod: GY | Performed by: INTERNAL MEDICINE

## 2017-12-15 PROCEDURE — 25000132 ZZH RX MED GY IP 250 OP 250 PS 637: Mod: GY | Performed by: CLINICAL NURSE SPECIALIST

## 2017-12-15 PROCEDURE — 40000133 ZZH STATISTIC OT WARD VISIT: Performed by: REHABILITATION PRACTITIONER

## 2017-12-15 PROCEDURE — 97535 SELF CARE MNGMENT TRAINING: CPT | Mod: GO | Performed by: REHABILITATION PRACTITIONER

## 2017-12-15 PROCEDURE — A9270 NON-COVERED ITEM OR SERVICE: HCPCS | Mod: GY | Performed by: CLINICAL NURSE SPECIALIST

## 2017-12-15 PROCEDURE — A9270 NON-COVERED ITEM OR SERVICE: HCPCS | Mod: GY | Performed by: NEUROLOGICAL SURGERY

## 2017-12-15 PROCEDURE — 97116 GAIT TRAINING THERAPY: CPT | Mod: GP | Performed by: PHYSICAL THERAPIST

## 2017-12-15 RX ADMIN — ACETAMINOPHEN 975 MG: 325 TABLET, FILM COATED ORAL at 09:22

## 2017-12-15 RX ADMIN — LEVOTHYROXINE SODIUM 75 MCG: 75 TABLET ORAL at 09:20

## 2017-12-15 RX ADMIN — PSYLLIUM HUSK 1.56 G: 20 CAPSULE ORAL at 09:21

## 2017-12-15 RX ADMIN — OXYCODONE HYDROCHLORIDE 2.5 MG: 5 TABLET ORAL at 10:46

## 2017-12-15 RX ADMIN — RALOXIFENE HYDROCHLORIDE 60 MG: 60 TABLET, FILM COATED ORAL at 09:21

## 2017-12-15 RX ADMIN — LOSARTAN POTASSIUM 25 MG: 25 TABLET, FILM COATED ORAL at 09:21

## 2017-12-15 RX ADMIN — OMEPRAZOLE 40 MG: 20 CAPSULE, DELAYED RELEASE ORAL at 09:21

## 2017-12-15 RX ADMIN — METOPROLOL SUCCINATE 25 MG: 25 TABLET, EXTENDED RELEASE ORAL at 09:21

## 2017-12-15 NOTE — PROGRESS NOTES
DAILY PROGRESS NOTE    Kimmy Shields is a 85 year old old female admitted on 12/13/2017  8:47 AM.    Subjective  Comfortable and ambulating      Objective    AAOx3, ABREU , f/c 4/4   Ambulating,     Hemoglobin   Date Value Ref Range Status   12/14/2017 10.2 (L) 11.7 - 15.7 g/dL Final   ]      Impression / Plan       Plan for today:    Patient doing well  Ambulates  Improving  D/c home today

## 2017-12-15 NOTE — DISCHARGE INSTRUCTIONS
"You just recently had back/neck surgery. Please, remember that surgery is invasive and thus that some pain is expected. Now that you are in recovery, there are a few important things to remember.     First, you should try to walk as much as possible during recovery. This mild stress stimulates our body's natural healing process and will speed your recovery. If you experience significantly increased pain while performing an act, STOP what you are doing, do not try to work through the pain. If you continue to work through the pain you may delay the healing process.     Second, it's crucial that you know your restrictions. Remember, this acronym \"BLT\". That is you should not Bend, Lift over 8 pounds (and over shoulder height if cervical patient), or Twist. After two weeks you may increase the weight limit up to 16 pounds, but you still may not bend or twist. These will then be reviewed further with you at your one month appointment. Please adhere to these and your recovery should progress normally.     Third, it is important to follow up after your surgery; specifically on two dates. You MUST see a health care provider (doesn't have to be us) to evaluate the incision 10-14 days after surgery. Why? We just want to make sure the surgical site is not infected and take out staples if used. Infection seems to peek around this time and it is very important to see a medical professional at this time interval. The later appointment you MUST attend is with us ([facility]), this will be your one month post-op visit.     Lastly, please remember for all medication refills, to have your pharmacy send a refill request or call our office at least 48hrs in advance before you run out of the medication.     We hope your recovery is speedy and that you gain the expected result from the surgery. Thank you for allowing us the opportunity to serve you. Call us at [phone] if you have questions or " concerns.  Opioid Medication Information    You have been given a prescription for an opioid (narcotic) pain medicine and/or have received a pain medicine. These medicines can make you drowsy or impaired. You must not drive, operate dangerous equipment, or engage in any other dangerous activities while taking these medications. If you drive while taking these medications, you could be arrested for DUI, or driving under the influence. Do not drink any alcohol while you are taking these medications.   Opioid pain medications can cause addiction. If you have a history of chemical dependency of any type, you are at a higher risk of becoming addicted to pain medications.  Only take these prescribed medications to treat your pain when all other options have been tried. Take it for as short a time and as few doses as possible. Store your pain pills in a secure place, as they are frequently stolen and provide a dangerous opportunity for children or visitors in your house to start abusing these powerful medications. We will not replace any lost or stolen medicine.  As soon as your pain is better, you should seek out a drug take back program (see your local police department) to dispose of them.   Over-the-counter medications and prescription drugs can pollute aldrich and be harmful to humans, fish, and other wildlife when disposed of improperly -- do not flush medications down the toilet or place in the trash.  Properly disposing of medicines is important to prevent abuse or poisoning and protect the environment.     Prescription and over-the-counter medications are collected anonymously from residents for free at [facility] drop-off locations. Visit the [facility]'s Office Prescription Drug Drop-Off page for the list of drop-off sites and information about the program.       [place]  Police Department [phone] Mon-Fri  8am to 4:30pm     [place]  Police Department [phone] 24hrs a  day    [place]  Police Department [phone] Mon-Fri  8am to 6:00pm     [place]  Police Department [phone] Mon-Fri  8am to 4:30pm     [place]  Police Department [phone] 24hrs a day      [place]  Police Department [phone] Mon-Fri  8am to 4:30pm   Many prescription pain medications contain Tylenol  (acetaminophen), including Vicodin , Tylenol #3 , Norco , Lortab , and Percocet .  You should not take any extra pills of Tylenol  if you are using these prescription medications or you can get very sick.  Do not ever take more than 3000 mg of acetaminophen in any 24 hour period.  All opioids tend to cause constipation. Drink plenty of water and eat foods that have a lot of fiber, such as fruits, vegetables, prune juice, apple juice and high fiber cereal.  Take a laxative if you don t move your bowels at least every other day. Miralax , Milk of Magnesia, Colace , or Senna  can be used to keep you regular.  You will likely need to continue stool softeners and stimulants while taking opioids.

## 2017-12-15 NOTE — PLAN OF CARE
Problem: Patient Care Overview  Goal: Plan of Care/Patient Progress Review  Outcome: Improving  A&O. Vitals monitored. Elevated BP. Dressing to the back is intact. CMS at baseline. SBA with a walker. Pain controlled with oral medications.

## 2017-12-15 NOTE — PLAN OF CARE
Problem: Patient Care Overview  Goal: Plan of Care/Patient Progress Review    Discharge Planner OT   Patient plan for discharge: Home with daughter assist   Current status: Completed SLUMs cognitive screen; pt scoring 14/30 indicating dementia level deficits, particularily in the areas of STM recall and sustained attention. Result shared with pt and daughter; pt stating she is aware of cogntive deficits. Provided handout for memory compensation. Educated in LB dressing with AE (reacher; pt has at home). Requiring VCs for proper reacher use and to maintain spinal precautions. Donned undergarment MOD I with reacher, SBA in stance to pull up pants. Pt attempted to don pants using reacher, extra time required; pt with unexpected slide towards edge of bed; MAX A to prevent fall and to reposition. Sit>stand SBA, ambulated to bathroom SBA with 2ww. Pt sit<>stand from comfort height toilet SBA. Discussed home shower set-up and educated in walk-in shower transfer; pt completed SBA with use of grab bars (same as home). Ambulated back to sit in chair; SBA. Pt with MOD A from her daughter to complete LB dressing (daughter will be staying with pt as long as needed).   Barriers to return to prior living situation: cognition; current level of assist   Recommendations for discharge: home with daughter assist for I/ADLs; recommend to follow-up with PCP once daughter returns home to determine if home health OT/HH aide/increased services is needed d/t cognitive deficits and pt lives alone  Rationale for recommendations: Patient's daughter will stay with patient and provide assist temporarily; pt with cognitive deficits and lives alone and would benefit from increased assist once daughter is no longer staying with pt.       Entered by: Yamileth Moreno 12/15/2017 12:00 PM

## 2017-12-15 NOTE — PLAN OF CARE
Problem: Patient Care Overview  Goal: Plan of Care/Patient Progress Review  Outcome: Improving  Vital signs stable.  Lungs clear, encouraged inspirometer use.  Bowel sounds hypoactive.  CMS baseline neuropathy in feet.  Dressing to lumbar spine has old dried drainage.Log rolled to turn reposition.  Ambulated with walker and sba of 1 in hallway.  Pain controlled with tylenol and oxycodone.  Voiding.  Seen by pain consult.  Pt wears hearing aides.

## 2017-12-15 NOTE — PLAN OF CARE
Problem: Patient Care Overview  Goal: Plan of Care/Patient Progress Review  Discharge Planner PT   Patient plan for discharge: home today  Current status: Pt meeting PT goals for home with exception of bed mobility needing rail or dtg support for repositioning and rolling in bed. Discussed with pt and dtg and practiced with dtg on performance.  Barriers to return to prior living situation: none  Recommendations for discharge: home with daughter  Rationale for recommendations: Pt meeting goals with the expectation of dtg assisting with bed mobility.        Entered by: Ilda Ko 12/15/2017 10:01 AM         Physical Therapy Discharge Summary    Reason for therapy discharge:    All goals and outcomes met, no further needs identified.    Progress towards therapy goal(s). See goals on Care Plan in [facility] electronic health record for goal details.  Goals met    Therapy recommendation(s):    Continue home exercise program.

## 2017-12-15 NOTE — PROVIDER NOTIFICATION
[phone] Paged Admitting pager : Patient's BP is elevated. 171/68 HR 74 and 173/71 HR 74. Any orders? Thank you.

## 2019-01-28 NOTE — OP NOTE
SUBJECTIVE:   Tiffany Sanz is a 39 year old female who presents to clinic today for the following health issues:    Rash  Onset: have been going on for months    Description:   Location: scalp and lip  Character: scalp is dry and hard blisters on lip and sores in the mouth  Itching (Pruritis): YES    Progression of Symptoms:  intermittent    Accompanying Signs & Symptoms:  Fever: no   Body aches or joint pain: no   Sore throat symptoms: no   Recent cold symptoms: no     History:   Previous similar rash: YES    Precipitating factors:   Exposure to similar rash: YES- son?   New exposures: None   Recent travel: no     Therapies Tried and outcome: Topical cream    URINARY TRACT SYMPTOMS      Duration: 3 weeks     Description  dysuria and frequency    Intensity:  mild    Accompanying signs and symptoms:  Fever/chills: no   Flank pain no   Nausea and vomiting: no   Vaginal symptoms: none  Abdominal/Pelvic Pain: YES- suprapubic pressure    History  History of frequent UTI's: no   History of kidney stones: no   Sexually Active: no   Possibility of pregnancy: No    Precipitating or alleviating factors: None    Therapies tried and outcome: none    Patient was seen 2 weeks ago and her urine culture was positive for strep. She was not treated due to strep is part of normal eleanor, however, patient reports that she is very uncomfortable, she has pressure, dysuria and can't tolerate it anymore.   Patient denies skin lesions or rash on genital area.     Patient also wants to do a full STD panel today including herpes blood work. No skin sores at this time.   Problem list and histories reviewed & adjusted, as indicated.  Additional history: as documented    Patient Active Problem List   Diagnosis     CARDIOVASCULAR SCREENING; LDL GOAL LESS THAN 160     Morbid obesity (H)     Past Surgical History:   Procedure Laterality Date     NO HISTORY OF SURGERY         Social History     Tobacco Use     Smoking status: Never Smoker      REPORT OF OPERATION  Kimmy Shields is a 85 year old old female admitted on 12/13/2017  8:47 AM.  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  2) BMI 32  Obesity   POST-PROCEDURE DIAGNOSIS:  1) Same as above  PROCEDURE PERFORMED:  1) L4/5  Direct lateral lumbar interbody fusion with discectomy, preparation of the endplate and placement of a PEEK interbody cage packed with  Tricalcium Phosphate  anterior to the transverse process, with intraoperative biplanar fluoroscopic imaging and electrophysiological monitoring in minimally invasive technique  2) L4/5  Posterior minimally invasive pedicle screw placement and posterolateral instrumentation and fusion with  intraoperative biplanar fluoroscopic imaging and electrophysiological monitoring.  4) Epidural steroid injection.  5) Transpedicular Bone marrow aspiration  6) Injection of 0.75 marcaine in paravertebral tissue for post op pain management    Surgeon: Sisi Hardy MD       HISTORY: Please refer to my clinic note for full details, but in short, patient is a 85 -year-old female with severe back pain and radiculopathy not responding to usual conservative therapy. Patient was set up for the surgery as mentioned above and was taken to surgery as mentioned above after all risks and benefits were explained.     PROCEDURE:   The patient was taken to surgery. After general anesthesia was applied, SCDs and Fischer  placed and preoperative antibiotic given, then patient has been positioned on the Wallace table and Jay frame in a modified prone position for ease of direct lateral access from the left side.    AP and lateral fluoroscopic images are positioned. Patient has been prepped and draped in sterile fashion. The landmarks, including Spinal process, transverse process, disk space, endplates and pedicels are identified and marked.    Following steps are then taken for  "Smokeless tobacco: Never Used     Tobacco comment: Roommate smokes   Substance Use Topics     Alcohol use: Yes     History reviewed. No pertinent family history.      Current Outpatient Medications   Medication Sig Dispense Refill     ferrous sulfate (IRON SUPPLEMENT) 325 (65 Fe) MG tablet Take 1 tablet (325 mg) by mouth 3 times daily (with meals) 90 tablet 1     hydrocortisone 2.5 % cream Apply BID to affected region(s) for 7-10 days. 30 g 0     ketoconazole (NIZORAL) 2 % external shampoo Apply topically daily as needed for itching or irritation 120 mL 11     penicillin V (VEETID) 500 MG tablet Take 1 tablet (500 mg) by mouth 2 times daily for 10 days 20 tablet 0     Prenatal Vit-Fe Sulfate-FA (PRENATAL MULTIVIT-IRON PO)        valACYclovir (VALTREX) 1000 mg tablet Take 1 tablet (1,000 mg) by mouth 2 times daily for 10 days 20 tablet 0     metroNIDAZOLE (FLAGYL) 500 MG tablet Take 500 mg by mouth       Allergies   Allergen Reactions     Nkda [No Known Drug Allergies]      Sulfa Drugs Itching       Reviewed and updated as needed this visit by clinical staff  Tobacco  Allergies  Meds  Problems  Med Hx  Surg Hx  Fam Hx  Soc Hx        Reviewed and updated as needed this visit by Provider  Tobacco  Allergies  Meds  Problems  Med Hx  Surg Hx  Fam Hx         ROS:  Constitutional, HEENT, cardiovascular, pulmonary, gi and gu systems are negative, except as otherwise noted.    OBJECTIVE:     /89 (BP Location: Left arm, Patient Position: Sitting, Cuff Size: Adult Large)   Pulse 84   Temp 99.2  F (37.3  C) (Oral)   Resp 18   Ht 1.664 m (5' 5.5\")   Wt 133.3 kg (293 lb 12.8 oz)   LMP 12/29/2018   SpO2 99%   BMI 48.15 kg/m    Body mass index is 48.15 kg/m .  GENERAL: healthy, alert and no distress  NECK: no adenopathy, no asymmetry, masses, or scars and thyroid normal to palpation  RESP: lungs clear to auscultation - no rales, rhonchi or wheezes  CV: regular rate and rhythm, normal S1 S2, no S3 or S4, " levels:    L4/5 Cage size 10 mm high and 27 mm long Titanium        The patient was turned using the rotation of the surgical table so that a direct lateral approach to the lumbar spine could be achieved. A 1incision was then made laterally and then using biplanar fluoroscopic visualization, under electrophysiological monitoring and stimulation, we introduced an electrophysiological  probe through the retro peritoneal space into the desired discs anterior to the transverse processes in  mid to  posterior third of the disk  and then passed it into the disc space after finding a silent window. The sleeve is retained and the probe is removed, then a K wire was passed into the disk space.  A dilating tube was then passed over the K-Wire. Following this, a working channel was then passed into the disc spaces. The working channel was manually held in position while a series of disc cleaning tools was passed through the channel to remove the affected discs, decompress the nerve roots, and decorticate the vertebral endplates at those segments. All steps are done with cleare and direct biplanar fluoroscopic visualization.       Arthrodesis of the intervertebral spaces via a retroperitoneal exposure and application of an intervertebral biomechanical device was then accomplished by using the working channel that had been placed in the retroperitoneal space anterior to the transverse processes. After adequate decompression and preparation of the endplates, we then put   Tricalcium Phosphate  into disc space and then a PEEK interbody was packed tightly with same material for stabilization and arthrodesis of the intervertebral spaces and inserted into the mid portion of the intervertebral discs laterally with clear and direct biplanar fluoroscopic visualization. All biologics was confined to the borders of the disc space. The working channel was then removed.      Following steps are then taken for levels:  L4 7.5mm Screw size  Right 55 Left 55 proud to adjust for listhesis    L5 7.5mm Screw size Right 45 Left 45     Then patient is rotated  for a true prone position and entry point for the pedicles is identified in the AP and lateral view, and then skin incision has been injected with local anesthetic. Then we entered the pedicle with a Jamshidi needle. Over the Jamshidi needle, we introduced the K wire in Vertebral body and use a dilator to dilate the muscles.  Additionally we use a small periostal elevator/ Facet  along the K-Wire to refresh the surface of the bone and facet and put  Tricalcium Phosphate  for additional posterolateral fusion. Then we put  pedicle screws bilaterally. Screws are all silent up to 18 MA of stimulation. After screws are all placed, we pass a jenelle through head of the screws under fluoroscopic imaging, we put the cap on and then  locked the jenelle in place and removed the screw tops.  Each incision has been closed with 2-0 Vicryl suture and then Steri-Strips applied.    Before the end of the surgery, we injected 40mg Kenalog and 1 cc 0.25% Marcaine for epidural steroid injection in epidural space under fluoroscopic imaging after we introduced the spinal needle and confirmed with injecting 2cc air and aspiration which confirms we are in the epidural space and no CSF is returned.  Before closing skin we inject 10  cc 0.75% marcaine in paravertebral tissue for post op pain management.     Additional finding: BMI 32  Obesity/Scoliosis made the surgery technically more challenging and so  extended     Estimated blood:  76cc.  DISPOSITION: To PACU with postoperative antibiotic. All counts are correct at the end of the surgery.  Sisi Hardy MD  cc: Sisi Hardy MD       no murmur, click or rub, no peripheral edema and peripheral pulses strong  ABDOMEN: soft, nontender, no hepatosplenomegaly, no masses and bowel sounds normal  : declined  MS: no gross musculoskeletal defects noted, no edema  SKIN: slightly dry   BACK: no CVA tenderness, no paralumbar tenderness    Diagnostic Test Results:  Results for orders placed or performed in visit on 01/28/19   *UA reflex to Microscopic and Culture ([place] [facility] (except [place] and [place])   Result Value Ref Range    Color Urine Yellow     Appearance Urine Slightly Cloudy     Glucose Urine Negative NEG^Negative mg/dL    Bilirubin Urine Negative NEG^Negative    Ketones Urine Trace (A) NEG^Negative mg/dL    Specific Gravity Urine 1.015 1.003 - 1.035    Blood Urine Negative NEG^Negative    pH Urine 6.5 5.0 - 7.0 pH    Protein Albumin Urine Negative NEG^Negative mg/dL    Urobilinogen Urine 0.2 0.2 - 1.0 EU/dL    Nitrite Urine Negative NEG^Negative    Leukocyte Esterase Urine Trace (A) NEG^Negative    Source Midstream Urine    Urine Microscopic   Result Value Ref Range    WBC Urine 5-10 (A) OTO5^0 - 5 /HPF    RBC Urine O - 2 OTO2^O - 2 /HPF    Squamous Epithelial /LPF Urine Few FEW^Few /LPF    Bacteria Urine Moderate (A) NEG^Negative /HPF   Herpes Simplex Virus 1 and 2 IgG   Result Value Ref Range    Herpes Simplex Virus Type 1 IgG >8.0 (H) 0.0 - 0.8 AI    Herpes Simplex Virus Type 2 IgG 6.0 (H) 0.0 - 0.8 AI   HIV Antigen Antibody Combo   Result Value Ref Range    HIV Antigen Antibody Combo Nonreactive NR^Nonreactive       Treponema Abs w Reflex to RPR and Titer   Result Value Ref Range    Treponema Antibodies Nonreactive NR^Nonreactive   NEISSERIA GONORRHOEA PCR   Result Value Ref Range    Specimen Descrip Vagina     N Gonorrhea PCR Negative NEG^Negative   CHLAMYDIA TRACHOMATIS PCR   Result Value Ref Range    Specimen Description Vagina     Chlamydia Trachomatis PCR Negative NEG^Negative   Strep, Rapid Screen   Result Value  Ref Range    Specimen Description Throat     Rapid Strep A Screen       NEGATIVE: No Group A streptococcal antigen detected by immunoassay, await culture report.   Urine Culture Aerobic Bacterial   Result Value Ref Range    Specimen Description Midstream Urine     Culture Micro (A)      >100,000 colonies/mL  Streptococcus agalactiae sero group B  Susceptibility testing not routinely done on this organism from the genitourinary tract.   Our antibiogram indicates that Group B streptococci are susceptible to ampicillin,   penicillin, vancomycin and the cephalosporins. Susceptibility testing must be requested   within 5 days.     Beta strep group A culture   Result Value Ref Range    Specimen Description Throat     Culture Micro No beta hemolytic Streptococcus Group A isolated          ASSESSMENT/PLAN:         ICD-10-CM    1. Acute cystitis without hematuria N30.00 *UA reflex to Microscopic and Culture ([place] and [facility] (except [place] and [place])     Urine Microscopic     penicillin V (VEETID) 500 MG tablet     Urine Culture Aerobic Bacterial   2. Seborrheic dermatitis L21.9 ketoconazole (NIZORAL) 2 % external shampoo   3. Herpes simplex virus infection B00.9 valACYclovir (VALTREX) 1000 mg tablet   4. Canker sores oral K12.0 Strep, Rapid Screen     Beta strep group A culture   5. Screen for STD (sexually transmitted disease) Z11.3 NEISSERIA GONORRHOEA PCR     CHLAMYDIA TRACHOMATIS PCR     Herpes Simplex Virus 1 and 2 IgG     HIV Antigen Antibody Combo     Treponema Abs w Reflex to RPR and Titer   1.Penicillin 1 tablet twice a day for 10 days     2.Ketoconazole shampoo apply to head and leave on for 5 minutes , wash every 2 days     3, 4. Most likely sores on lips and mouth are due to herpes virus.  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     Jazmyne Tavares PA-C  [facility]

## 2019-07-19 ENCOUNTER — TELEPHONE (OUTPATIENT)
Dept: ONCOLOGY | Facility: CLINIC | Age: 84
End: 2019-07-19

## 2019-07-19 NOTE — TELEPHONE ENCOUNTER
Called patient to schedule an appointment with Dr. Clark, regarding her uterine mass.  Patient indicated that she already had an appointment all set up in [place].  I was unable to see in [facility] where Kimmy was schedule, so this must not be in network.    Dr. Julissa Burns from [facility], [facility], referred Mrs. Smith to Dr. Clark.    Is there further follow up you would like from Oncology Intake to find out why Mrs. Shields wasn't scheduled at [facility]?    Thank you,  Brooke

## 2019-07-19 NOTE — TELEPHONE ENCOUNTER
ONCOLOGY INTAKE: Records Information      APPT INFORMATION:  Referring provider:  Dr. Sekou Larios  Referring provider s clinic:  [facility]  Reason for visit/diagnosis:  Uterine mass  Has patient been notified of appointment date and time?: no    RECORDS INFORMATION:  Were the records received with the referral (via [employer])? yes    Has patient been seen for any external appt for this diagnosis? yes    If yes, where? [facility]    Has patient had any imaging or procedures outside of [place] for this condition? yes      If Yes, where? [facility]    ADDITIONAL INFORMATION:  Referral and records are saved on the [address], please fax to [place] after scheduling

## (undated) DEVICE — BLADE KNIFE SURG 11 371111

## (undated) DEVICE — DRSG STERI STRIP 1/2X4" R1547

## (undated) DEVICE — DRAPE MAYO STAND 23X54 8337

## (undated) DEVICE — BAG CLEAR TRASH 1.3M 39X33" P4040C

## (undated) DEVICE — SYR 10ML LL W/O NDL 302995

## (undated) DEVICE — SOL WATER IRRIG 1000ML BOTTLE 2F7114

## (undated) DEVICE — SYR 20ML LL W/O NDL 302830

## (undated) DEVICE — Device

## (undated) DEVICE — CATH TRAY FOLEY SURESTEP 16FR W/URNE MTR STLK LATEX A303316A

## (undated) DEVICE — SYR 03ML LL W/O NDL 309657

## (undated) DEVICE — SU VICRYL 2-0 CT-2 CR 8X18" J726D

## (undated) DEVICE — GLOVE PROTEXIS POWDER FREE 8.0 ORTHOPEDIC 2D73ET80

## (undated) DEVICE — DRAPE IOBAN ISOLATION VERTICAL 6619

## (undated) DEVICE — K WIRE

## (undated) DEVICE — ESU GROUND PAD ADULT W/CORD E7507

## (undated) DEVICE — LINEN ORTHO ACL PACK 5447

## (undated) DEVICE — DRAPE C-ARM 60X42" 1013

## (undated) DEVICE — CUSHION INSERT LG PRONE VIEW JACKSON TABLE

## (undated) DEVICE — PREP DURAPREP 26ML APL 8630

## (undated) DEVICE — DRSG ABDOMINAL 07 1/2X8" 7197D

## (undated) DEVICE — NDL SPINAL 18GA 3.5" 405184

## (undated) DEVICE — LINEN DRAPE 54X72" 5467

## (undated) DEVICE — PACK SMALL SPINE RIDGES

## (undated) DEVICE — GLOVE PROTEXIS POWDER FREE 6.5 ORTHOPEDIC 2D73ET65

## (undated) DEVICE — GLOVE PROTEXIS POWDER FREE SMT 8.0  2D72PT80X

## (undated) DEVICE — SUCTION MANIFOLD NEPTUNE 2 SYS 4 PORT 0702-020-000

## (undated) DEVICE — MIDAS REX DISSECTING TOOL  14MH30

## (undated) RX ORDER — LIDOCAINE HYDROCHLORIDE AND EPINEPHRINE 10; 10 MG/ML; UG/ML
INJECTION, SOLUTION INFILTRATION; PERINEURAL
Status: DISPENSED
Start: 2017-12-13

## (undated) RX ORDER — FENTANYL CITRATE 50 UG/ML
INJECTION, SOLUTION INTRAMUSCULAR; INTRAVENOUS
Status: DISPENSED
Start: 2017-12-13

## (undated) RX ORDER — DEXAMETHASONE SODIUM PHOSPHATE 4 MG/ML
INJECTION, SOLUTION INTRA-ARTICULAR; INTRALESIONAL; INTRAMUSCULAR; INTRAVENOUS; SOFT TISSUE
Status: DISPENSED
Start: 2017-12-13

## (undated) RX ORDER — LABETALOL HYDROCHLORIDE 5 MG/ML
INJECTION, SOLUTION INTRAVENOUS
Status: DISPENSED
Start: 2017-12-13

## (undated) RX ORDER — LIDOCAINE HYDROCHLORIDE 10 MG/ML
INJECTION, SOLUTION EPIDURAL; INFILTRATION; INTRACAUDAL; PERINEURAL
Status: DISPENSED
Start: 2017-12-13

## (undated) RX ORDER — BUPIVACAINE HYDROCHLORIDE 2.5 MG/ML
INJECTION, SOLUTION EPIDURAL; INFILTRATION; INTRACAUDAL
Status: DISPENSED
Start: 2017-12-13

## (undated) RX ORDER — ACETAMINOPHEN 10 MG/ML
INJECTION, SOLUTION INTRAVENOUS
Status: DISPENSED
Start: 2017-12-13

## (undated) RX ORDER — BUPIVACAINE HYDROCHLORIDE 7.5 MG/ML
INJECTION, SOLUTION EPIDURAL; RETROBULBAR
Status: DISPENSED
Start: 2017-12-13

## (undated) RX ORDER — TRIAMCINOLONE ACETONIDE 40 MG/ML
INJECTION, SUSPENSION INTRA-ARTICULAR; INTRAMUSCULAR
Status: DISPENSED
Start: 2017-12-13

## (undated) RX ORDER — GLYCOPYRROLATE 0.2 MG/ML
INJECTION INTRAMUSCULAR; INTRAVENOUS
Status: DISPENSED
Start: 2017-12-13

## (undated) RX ORDER — ONDANSETRON 2 MG/ML
INJECTION INTRAMUSCULAR; INTRAVENOUS
Status: DISPENSED
Start: 2017-12-13

## (undated) RX ORDER — HYDROMORPHONE HYDROCHLORIDE 1 MG/ML
INJECTION, SOLUTION INTRAMUSCULAR; INTRAVENOUS; SUBCUTANEOUS
Status: DISPENSED
Start: 2017-12-13

## (undated) RX ORDER — CEFAZOLIN SODIUM 2 G/100ML
INJECTION, SOLUTION INTRAVENOUS
Status: DISPENSED
Start: 2017-12-13

## (undated) RX ORDER — PROPOFOL 10 MG/ML
INJECTION, EMULSION INTRAVENOUS
Status: DISPENSED
Start: 2017-12-13

## (undated) RX ORDER — KETOROLAC TROMETHAMINE 15 MG/ML
INJECTION, SOLUTION INTRAMUSCULAR; INTRAVENOUS
Status: DISPENSED
Start: 2017-12-13